# Patient Record
Sex: FEMALE | Race: WHITE | Employment: PART TIME | URBAN - METROPOLITAN AREA
[De-identification: names, ages, dates, MRNs, and addresses within clinical notes are randomized per-mention and may not be internally consistent; named-entity substitution may affect disease eponyms.]

---

## 2020-04-28 ENCOUNTER — APPOINTMENT (OUTPATIENT)
Dept: CT IMAGING | Age: 25
End: 2020-04-28
Payer: COMMERCIAL

## 2020-04-28 ENCOUNTER — HOSPITAL ENCOUNTER (EMERGENCY)
Age: 25
Discharge: LEFT BEFORE DISCHARGE | End: 2020-04-28
Attending: EMERGENCY MEDICINE
Payer: COMMERCIAL

## 2020-04-28 ENCOUNTER — TELEPHONE (OUTPATIENT)
Dept: ADMINISTRATIVE | Age: 25
End: 2020-04-28

## 2020-04-28 ENCOUNTER — APPOINTMENT (OUTPATIENT)
Dept: GENERAL RADIOLOGY | Age: 25
End: 2020-04-28
Payer: COMMERCIAL

## 2020-04-28 VITALS
SYSTOLIC BLOOD PRESSURE: 145 MMHG | BODY MASS INDEX: 51.53 KG/M2 | RESPIRATION RATE: 18 BRPM | WEIGHT: 280 LBS | OXYGEN SATURATION: 98 % | HEIGHT: 62 IN | HEART RATE: 73 BPM | TEMPERATURE: 97.3 F | DIASTOLIC BLOOD PRESSURE: 80 MMHG

## 2020-04-28 LAB
ALBUMIN SERPL-MCNC: 4.1 G/DL (ref 3.5–5.2)
ALP BLD-CCNC: 64 U/L (ref 35–104)
ALT SERPL-CCNC: 18 U/L (ref 0–32)
ANION GAP SERPL CALCULATED.3IONS-SCNC: 15 MMOL/L (ref 7–16)
AST SERPL-CCNC: 19 U/L (ref 0–31)
BASOPHILS ABSOLUTE: 0.07 E9/L (ref 0–0.2)
BASOPHILS RELATIVE PERCENT: 0.8 % (ref 0–2)
BILIRUB SERPL-MCNC: 0.4 MG/DL (ref 0–1.2)
BUN BLDV-MCNC: 14 MG/DL (ref 6–20)
CALCIUM SERPL-MCNC: 9.2 MG/DL (ref 8.6–10.2)
CHLORIDE BLD-SCNC: 108 MMOL/L (ref 98–107)
CO2: 21 MMOL/L (ref 22–29)
CREAT SERPL-MCNC: 0.5 MG/DL (ref 0.5–1)
EOSINOPHILS ABSOLUTE: 0.17 E9/L (ref 0.05–0.5)
EOSINOPHILS RELATIVE PERCENT: 1.8 % (ref 0–6)
GFR AFRICAN AMERICAN: >60
GFR NON-AFRICAN AMERICAN: >60 ML/MIN/1.73
GLUCOSE BLD-MCNC: 84 MG/DL (ref 74–99)
HCG, URINE, POC: NEGATIVE
HCT VFR BLD CALC: 36.8 % (ref 34–48)
HEMOGLOBIN: 11.9 G/DL (ref 11.5–15.5)
IMMATURE GRANULOCYTES #: 0.03 E9/L
IMMATURE GRANULOCYTES %: 0.3 % (ref 0–5)
LYMPHOCYTES ABSOLUTE: 1.93 E9/L (ref 1.5–4)
LYMPHOCYTES RELATIVE PERCENT: 20.8 % (ref 20–42)
Lab: NORMAL
MCH RBC QN AUTO: 29.2 PG (ref 26–35)
MCHC RBC AUTO-ENTMCNC: 32.3 % (ref 32–34.5)
MCV RBC AUTO: 90.4 FL (ref 80–99.9)
MONOCYTES ABSOLUTE: 0.62 E9/L (ref 0.1–0.95)
MONOCYTES RELATIVE PERCENT: 6.7 % (ref 2–12)
NEGATIVE QC PASS/FAIL: NORMAL
NEUTROPHILS ABSOLUTE: 6.44 E9/L (ref 1.8–7.3)
NEUTROPHILS RELATIVE PERCENT: 69.6 % (ref 43–80)
PDW BLD-RTO: 12.2 FL (ref 11.5–15)
PLATELET # BLD: 240 E9/L (ref 130–450)
PMV BLD AUTO: 11.3 FL (ref 7–12)
POSITIVE QC PASS/FAIL: NORMAL
POTASSIUM REFLEX MAGNESIUM: 3.6 MMOL/L (ref 3.5–5)
RBC # BLD: 4.07 E12/L (ref 3.5–5.5)
REASON FOR REJECTION: NORMAL
REJECTED TEST: NORMAL
SODIUM BLD-SCNC: 144 MMOL/L (ref 132–146)
TOTAL PROTEIN: 7 G/DL (ref 6.4–8.3)
TROPONIN: <0.01 NG/ML (ref 0–0.03)
WBC # BLD: 9.3 E9/L (ref 4.5–11.5)

## 2020-04-28 PROCEDURE — 84484 ASSAY OF TROPONIN QUANT: CPT

## 2020-04-28 PROCEDURE — 99285 EMERGENCY DEPT VISIT HI MDM: CPT

## 2020-04-28 PROCEDURE — 6360000004 HC RX CONTRAST MEDICATION: Performed by: RADIOLOGY

## 2020-04-28 PROCEDURE — 93005 ELECTROCARDIOGRAM TRACING: CPT | Performed by: EMERGENCY MEDICINE

## 2020-04-28 PROCEDURE — 85025 COMPLETE CBC W/AUTO DIFF WBC: CPT

## 2020-04-28 PROCEDURE — 71045 X-RAY EXAM CHEST 1 VIEW: CPT

## 2020-04-28 PROCEDURE — 80053 COMPREHEN METABOLIC PANEL: CPT

## 2020-04-28 RX ORDER — SODIUM CHLORIDE 0.9 % (FLUSH) 0.9 %
10 SYRINGE (ML) INJECTION 2 TIMES DAILY
Status: DISCONTINUED | OUTPATIENT
Start: 2020-04-28 | End: 2020-04-28 | Stop reason: HOSPADM

## 2020-04-28 RX ADMIN — IOPAMIDOL 60 ML: 755 INJECTION, SOLUTION INTRAVENOUS at 16:26

## 2020-04-28 ASSESSMENT — PAIN DESCRIPTION - PAIN TYPE: TYPE: ACUTE PAIN

## 2020-04-28 ASSESSMENT — PAIN DESCRIPTION - ORIENTATION: ORIENTATION: MID

## 2020-04-28 ASSESSMENT — PAIN DESCRIPTION - LOCATION: LOCATION: CHEST

## 2020-04-28 ASSESSMENT — PAIN SCALES - GENERAL: PAINLEVEL_OUTOF10: 6

## 2020-04-28 NOTE — ED PROVIDER NOTES
HPI:    Louis Gutierrez is a 30 y.o. female presenting to the emergency department for shortness of breath. Patient reports she tested positive for COVID on March 31st. She reports her symptoms had improved since being diagnosed and was able to go back to work at [employer]. Patient worked today as she felt more short of breath with associated chest pain, as she was sent to the ED for further evaluation. Patient believes she may have over did it at work. She denies current birth control or hormone therapy, history of blood clots, recent immobilization or cancer. No recent fever or chills although she notes some fatigue and cough at times. She admits to OTC medications, no prescription medication use. The history is provided by the patient. Shortness of Breath   Severity:  Mild  Onset quality:  Gradual  Timing:  Intermittent  Progression:  Waxing and waning  Chronicity:  Recurrent  Relieved by:  Rest  Worsened by: Activity  Associated symptoms: chest pain and cough    Associated symptoms: no abdominal pain, no fever, no headaches, no neck pain, no sore throat and no vomiting         Review of Systems   Constitutional: Negative for chills and fever. HENT: Negative for congestion and sore throat. Eyes: Negative for visual disturbance. Respiratory: Positive for cough and shortness of breath. Cardiovascular: Positive for chest pain. Gastrointestinal: Negative for abdominal pain, nausea and vomiting. Genitourinary: Negative for difficulty urinating and flank pain. Musculoskeletal: Negative for neck pain. Skin: Negative for color change. Neurological: Negative for headaches. Psychiatric/Behavioral: Negative for confusion. Physical Exam  Vitals signs and nursing note reviewed. Constitutional:       General: She is not in acute distress. HENT:      Head: Normocephalic and atraumatic.       Nose: Nose normal.      Mouth/Throat:      Mouth: Mucous membranes are moist.      Pharynx: No in order to evaluate for PE as patient was positive for covid and concern for hypercoagulable state. Patient had cta ordered, however was unable to be completed as the IV malfunctioned. The patient then refused any further testing despite our conversations and explaining my concern. Patient understood the risk of leaving without completing the work up- including worsening respiratory status and death. She has the capacity to make her own decisions. I adivsed close follow up outpatient or return to the ED if symptoms worsen. Patient will leave AMA. ED Course as of Apr 30 1434   Tue Apr 28, 2020   1649 Patient reassessed. CTA was to be performed however the IV site blew. Patient unable to have study performed. Patient is now refusing any further IV insertions. I discussed options for the patient including need for CTA to rule out blood clot with recent coronavirus and concern for hypercoagulable state. Patient wants to leave [address] and follow-up outpatient. I discussed the risk of leaving including deterioration of her respiratory status as well as death. Patient understands the risks and has the capacity to make her own decisions. Patient signed out. [MA]      ED Course User Index  [MA] Tony Rivas, DO        ----------------------------------------------- PAST HISTORY --------------------------------------------  Past Medical History:  has no past medical history on file. Past Surgical History:  has no past surgical history on file. Social History:  reports that she has never smoked. She has never used smokeless tobacco. She reports current alcohol use. She reports that she does not use drugs. Family History: family history is not on file. The patients home medications have been reviewed. Allergies: Patient has no known allergies.     ------------------------------------------------ RESULTS ---------------------------------------------------    LABS:  Results for BPM    P-R Interval 140 ms    QRS Duration 80 ms    Q-T Interval 370 ms    QTc Calculation (Bazett) 442 ms    P Axis 45 degrees    R Axis 48 degrees    T Axis 3 degrees       RADIOLOGY:    All Radiology results interpreted by Radiologist unless otherwise noted. XR CHEST PORTABLE   Final Result   normal chest                         EKG:  This EKG is signed and interpreted by ED Physician. Time: 1252  Rate: 86  Rhythm: Sinus. Interpretation: Normal sinus rhythm, normal axis, no QTC prolongation, no ST elevation. Comparison: no previous EKG.    ---------------------------- NURSING NOTES AND VITALS REVIEWED -------------------------   The nursing notes within the ED encounter and vital signs as below have been reviewed. BP (!) 145/80   Pulse 73   Temp 97.3 °F (36.3 °C) (Oral)   Resp 18   Ht 5' 2\" (1.575 m)   Wt 280 lb (127 kg)   LMP 03/28/2020   SpO2 98%   BMI 51.21 kg/m²   Oxygen Saturation Interpretation: Normal      ------------------------------------------PROGRESS NOTES -------------------------------------------    ED COURSE MEDICATIONS:                Medications   iopamidol (ISOVUE-370) 76 % injection 60 mL (60 mLs Intravenous Given 4/28/20 1626)       CONSULTATIONS:            none. PROCEDURES:            none. COUNSELING:   I have spoken with the patient and discussed todays results, in addition to providing specific details for the plan of care and counseling regarding the diagnosis and prognosis.     --------------------------------------- IMPRESSION & DISPOSITION --------------------------------     IMPRESSION(s):  1. Chest pain, unspecified type    2. Dyspnea and respiratory abnormalities        This patient's ED course included: a personal history and physicial examination, cardiac monitoring and continuous pulse oximetry    This patient has remained hemodynamically stable and been closely monitored during their ED course.     DISPOSITION:  Disposition: Other Disposition: Left

## 2020-04-29 LAB
EKG ATRIAL RATE: 86 BPM
EKG P AXIS: 45 DEGREES
EKG P-R INTERVAL: 140 MS
EKG Q-T INTERVAL: 370 MS
EKG QRS DURATION: 80 MS
EKG QTC CALCULATION (BAZETT): 442 MS
EKG R AXIS: 48 DEGREES
EKG T AXIS: 3 DEGREES
EKG VENTRICULAR RATE: 86 BPM

## 2020-04-30 ASSESSMENT — ENCOUNTER SYMPTOMS
ABDOMINAL PAIN: 0
VOMITING: 0
SORE THROAT: 0
NAUSEA: 0
COUGH: 1
SHORTNESS OF BREATH: 1
COLOR CHANGE: 0

## 2021-04-02 ENCOUNTER — IMMUNIZATION (OUTPATIENT)
Dept: PRIMARY CARE CLINIC | Age: 26
End: 2021-04-02
Payer: COMMERCIAL

## 2021-04-02 PROCEDURE — 0001A COVID-19, PFIZER VACCINE 30MCG/0.3ML DOSE: CPT | Performed by: CLINICAL NURSE SPECIALIST

## 2021-04-02 PROCEDURE — 91300 COVID-19, PFIZER VACCINE 30MCG/0.3ML DOSE: CPT | Performed by: CLINICAL NURSE SPECIALIST

## 2021-05-05 ENCOUNTER — IMMUNIZATION (OUTPATIENT)
Dept: PRIMARY CARE CLINIC | Age: 26
End: 2021-05-05
Payer: COMMERCIAL

## 2021-05-05 PROCEDURE — 0002A COVID-19, PFIZER VACCINE 30MCG/0.3ML DOSE: CPT | Performed by: NURSE PRACTITIONER

## 2021-05-05 PROCEDURE — 91300 COVID-19, PFIZER VACCINE 30MCG/0.3ML DOSE: CPT | Performed by: NURSE PRACTITIONER

## 2021-09-03 ENCOUNTER — OFFICE VISIT (OUTPATIENT)
Dept: FAMILY MEDICINE CLINIC | Age: 26
End: 2021-09-03
Payer: COMMERCIAL

## 2021-09-03 VITALS
WEIGHT: 293 LBS | HEIGHT: 62 IN | HEART RATE: 88 BPM | SYSTOLIC BLOOD PRESSURE: 137 MMHG | TEMPERATURE: 98.2 F | DIASTOLIC BLOOD PRESSURE: 75 MMHG | OXYGEN SATURATION: 96 % | BODY MASS INDEX: 53.92 KG/M2

## 2021-09-03 DIAGNOSIS — Z13.1 ENCOUNTER FOR SCREENING FOR DIABETES MELLITUS: ICD-10-CM

## 2021-09-03 DIAGNOSIS — N91.5 OLIGOMENORRHEA, UNSPECIFIED TYPE: ICD-10-CM

## 2021-09-03 DIAGNOSIS — L68.0 HIRSUTISM: ICD-10-CM

## 2021-09-03 DIAGNOSIS — E66.01 CLASS 3 SEVERE OBESITY DUE TO EXCESS CALORIES WITHOUT SERIOUS COMORBIDITY WITH BODY MASS INDEX (BMI) OF 50.0 TO 59.9 IN ADULT (HCC): ICD-10-CM

## 2021-09-03 DIAGNOSIS — N91.2 AMENORRHEA: ICD-10-CM

## 2021-09-03 DIAGNOSIS — R53.83 FATIGUE, UNSPECIFIED TYPE: ICD-10-CM

## 2021-09-03 DIAGNOSIS — Z13.31 POSITIVE DEPRESSION SCREENING: Primary | ICD-10-CM

## 2021-09-03 PROCEDURE — 99212 OFFICE O/P EST SF 10 MIN: CPT | Performed by: STUDENT IN AN ORGANIZED HEALTH CARE EDUCATION/TRAINING PROGRAM

## 2021-09-03 PROCEDURE — 99213 OFFICE O/P EST LOW 20 MIN: CPT | Performed by: STUDENT IN AN ORGANIZED HEALTH CARE EDUCATION/TRAINING PROGRAM

## 2021-09-03 RX ORDER — ESCITALOPRAM OXALATE 5 MG/1
5 TABLET ORAL DAILY
Qty: 30 TABLET | Refills: 5 | Status: SHIPPED
Start: 2021-09-03 | End: 2022-02-28

## 2021-09-03 SDOH — ECONOMIC STABILITY: FOOD INSECURITY: WITHIN THE PAST 12 MONTHS, YOU WORRIED THAT YOUR FOOD WOULD RUN OUT BEFORE YOU GOT MONEY TO BUY MORE.: NEVER TRUE

## 2021-09-03 SDOH — ECONOMIC STABILITY: FOOD INSECURITY: WITHIN THE PAST 12 MONTHS, THE FOOD YOU BOUGHT JUST DIDN'T LAST AND YOU DIDN'T HAVE MONEY TO GET MORE.: NEVER TRUE

## 2021-09-03 ASSESSMENT — COLUMBIA-SUICIDE SEVERITY RATING SCALE - C-SSRS
6. HAVE YOU EVER DONE ANYTHING, STARTED TO DO ANYTHING, OR PREPARED TO DO ANYTHING TO END YOUR LIFE?: NO
2. HAVE YOU ACTUALLY HAD ANY THOUGHTS OF KILLING YOURSELF?: NO
1. WITHIN THE PAST MONTH, HAVE YOU WISHED YOU WERE DEAD OR WISHED YOU COULD GO TO SLEEP AND NOT WAKE UP?: NO

## 2021-09-03 ASSESSMENT — SOCIAL DETERMINANTS OF HEALTH (SDOH): HOW HARD IS IT FOR YOU TO PAY FOR THE VERY BASICS LIKE FOOD, HOUSING, MEDICAL CARE, AND HEATING?: NOT HARD AT ALL

## 2021-09-03 ASSESSMENT — PATIENT HEALTH QUESTIONNAIRE - PHQ9
1. LITTLE INTEREST OR PLEASURE IN DOING THINGS: 2
10. IF YOU CHECKED OFF ANY PROBLEMS, HOW DIFFICULT HAVE THESE PROBLEMS MADE IT FOR YOU TO DO YOUR WORK, TAKE CARE OF THINGS AT HOME, OR GET ALONG WITH OTHER PEOPLE: 1
SUM OF ALL RESPONSES TO PHQ QUESTIONS 1-9: 11
3. TROUBLE FALLING OR STAYING ASLEEP: 2
SUM OF ALL RESPONSES TO PHQ9 QUESTIONS 1 & 2: 3
SUM OF ALL RESPONSES TO PHQ QUESTIONS 1-9: 11
8. MOVING OR SPEAKING SO SLOWLY THAT OTHER PEOPLE COULD HAVE NOTICED. OR THE OPPOSITE, BEING SO FIGETY OR RESTLESS THAT YOU HAVE BEEN MOVING AROUND A LOT MORE THAN USUAL: 0
4. FEELING TIRED OR HAVING LITTLE ENERGY: 0
9. THOUGHTS THAT YOU WOULD BE BETTER OFF DEAD, OR OF HURTING YOURSELF: 0
7. TROUBLE CONCENTRATING ON THINGS, SUCH AS READING THE NEWSPAPER OR WATCHING TELEVISION: 3
2. FEELING DOWN, DEPRESSED OR HOPELESS: 1
6. FEELING BAD ABOUT YOURSELF - OR THAT YOU ARE A FAILURE OR HAVE LET YOURSELF OR YOUR FAMILY DOWN: 2
5. POOR APPETITE OR OVEREATING: 1
SUM OF ALL RESPONSES TO PHQ QUESTIONS 1-9: 11

## 2021-09-03 ASSESSMENT — LIFESTYLE VARIABLES
HOW OFTEN DO YOU HAVE A DRINK CONTAINING ALCOHOL: 2-4 TIMES A MONTH
HOW MANY STANDARD DRINKS CONTAINING ALCOHOL DO YOU HAVE ON A TYPICAL DAY: 1 OR 2

## 2021-09-03 NOTE — PROGRESS NOTES
S: 30 y.o. female here for establishing care and hirsutism. AUB more recently. Gained 30 lbs during pandemic. Some depression now. No SI or HI. PHQ9 = 11. Nulliparous, never sexually active. O: VS: /75 (Site: Right Lower Arm, Position: Sitting, Cuff Size: Medium Adult)   Pulse 88   Temp 98.2 °F (36.8 °C) (Temporal)   Ht 5' 2\" (1.575 m)   Wt (!) 305 lb (138.3 kg)   SpO2 96%   BMI 55.79 kg/m²    General: NAD, alert and interacting appropriately. CV:  RRR, no gallops, rubs, or murmurs    Resp: CTAB   Abd:  Soft, nontender   Ext:  No edema    Justin Baires was seen today for new patient, menstrual problem and hirsutism. Diagnoses and all orders for this visit:    Positive depression screening  -     Cancel: Positive Screen for Clinical Depression with a Documented Follow-up Plan   -     escitalopram (LEXAPRO) 5 MG tablet; Take 1 tablet by mouth daily    Fatigue, unspecified type  -     Cancel: TSH; Future  -     TSH; Future    Hirsutism  -     Cancel: 17-HYDROXYPROGESTERONE; Future  -     Cancel: Testosterone, free, total; Future  -     Cancel: HCG, QUANTITATIVE, PREGNANCY; Future  -     Cancel: PROLACTIN; Future  -     Cancel: TSH; Future  -     Cancel: FOLLICLE STIMULATING HORMONE; Future  -     TESTOSTERONE; Future  -     17-HYDROXYPROGESTERONE; Future    Amenorrhea  -     Cancel: 17-HYDROXYPROGESTERONE; Future  -     Cancel: Testosterone, free, total; Future  -     Cancel: HCG, QUANTITATIVE, PREGNANCY; Future  -     Cancel: PROLACTIN; Future  -     Cancel: TSH; Future  -     Cancel: FOLLICLE STIMULATING HORMONE; Future  -     FOLLICLE STIMULATING HORMONE; Future  -     LUTEINIZING HORMONE; Future  -     PROLACTIN; Future  -     HCG, QUANTITATIVE, PREGNANCY;  Future  -     TESTOSTERONE; Future  -     17-HYDROXYPROGESTERONE; Future  -     HEMOGLOBIN A1C; Future    Class 3 severe obesity due to excess calories without serious comorbidity with body mass index (BMI) of 50.0 to 59.9 in adult ([facility] [address]  -     Cancel: LIPID PANEL; Future  -     LIPID PANEL; Future  -     HEMOGLOBIN A1C; Future    Encounter for screening for diabetes mellitus  -     Cancel: HEMOGLOBIN A1C; Future    Oligomenorrhea, unspecified type  -     US NON OB TRANSVAGINAL; Future         Attending Physician Statement  I have discussed the case, including pertinent history and exam findings with the resident. I agree with the documented assessment and plan.

## 2021-09-03 NOTE — PROGRESS NOTES
[address] [facility]  FAMILY MEDICINE RESIDENCY PROGRAM  DATE OF VISIT : 9/3/2021    Patient : Roma Reyes   Age : 30 y.o.  : 1995   MRN : <[id number]>   ______________________________________________________________________    Chief Complaint :   Chief Complaint   Patient presents with    New Patient    Menstrual Problem    Hirsutism       HPI : Roma Reyes is 30 y.o. female who presented to the clinic today for establishing care and for concern of oligomenorrhea and hirsutism. Patient states she had mostly regular menses prior to having covid about a year ago. Her first menses after covid was 3 months late and then has been intermittent without regularity since then. Her last full menses was in July, and she has been spotting since then. Small clots present. She also noticed that she started to develop facial hair growth after covid as well. She also notes that she has had some depression since the pandemic started. She tries to stay connected with people but once home alone she feels her mood drop and has less motivation. She also reports fatigue and 30 lbs weight gain over the pandemic. She went to the ED about a year ago for chest pain, and reports SOB with stairs, but nothing that stops her. No current chest pain. She reports occasional pain with defecation. She takes occasional melatonin. Past Medical History :  No past medical history on file. No past surgical history on file. Allergies :   No Known Allergies    Medication List :    Current Outpatient Medications   Medication Sig Dispense Refill    escitalopram (LEXAPRO) 5 MG tablet Take 1 tablet by mouth daily 30 tablet 5     No current facility-administered medications for this visit. Review of Systems :  Review of Systems   Constitutional: Positive for fatigue and unexpected weight change (gain). Respiratory: Positive for shortness of breath (with stairs).     Cardiovascular: Negative comorbidity with body mass index (BMI) of 50.0 to 59.9 in adult [facility])   Encounter for screening for diabetes mellitus  - LIPID PANEL; Future  - HEMOGLOBIN A1C; Future    Oligomenorrhea, unspecified type  - US NON OB TRANSVAGINAL; Future      Additional plan and future considerations:       Return to Office: Return in about 4 weeks (around 10/1/2021) for oligomenorrhea.     Grant Peoples DO   Case discussed with Dr. Yuli Gale

## 2021-09-04 ENCOUNTER — HOSPITAL ENCOUNTER (OUTPATIENT)
Age: 26
Discharge: HOME | End: 2021-09-04
Payer: COMMERCIAL

## 2021-09-04 DIAGNOSIS — E66.01 CLASS 3 SEVERE OBESITY DUE TO EXCESS CALORIES WITHOUT SERIOUS COMORBIDITY WITH BODY MASS INDEX (BMI) OF 50.0 TO 59.9 IN ADULT (HCC): ICD-10-CM

## 2021-09-04 DIAGNOSIS — R53.83 FATIGUE, UNSPECIFIED TYPE: ICD-10-CM

## 2021-09-04 DIAGNOSIS — N91.2 AMENORRHEA: ICD-10-CM

## 2021-09-04 DIAGNOSIS — L68.0 HIRSUTISM: ICD-10-CM

## 2021-09-04 LAB
CHOLESTEROL, TOTAL: 150 MG/DL (ref 0–199)
FOLLICLE STIMULATING HORMONE: 7.9 MIU/ML
GONADOTROPIN, CHORIONIC (HCG) QUANT: <0.1 MIU/ML
HBA1C MFR BLD: 5.4 % (ref 4–5.6)
HDLC SERPL-MCNC: 48 MG/DL
LDL CHOLESTEROL CALCULATED: 75 MG/DL (ref 0–99)
LUTEINIZING HORMONE: 14 MIU/ML
PROLACTIN: 35.47 NG/ML
TESTOSTERONE TOTAL: 32.2 NG/DL
TRIGL SERPL-MCNC: 136 MG/DL (ref 0–149)
TSH SERPL DL<=0.05 MIU/L-ACNC: 2.53 UIU/ML (ref 0.27–4.2)
VLDLC SERPL CALC-MCNC: 27 MG/DL

## 2021-09-04 PROCEDURE — 83498 ASY HYDROXYPROGESTERONE 17-D: CPT

## 2021-09-04 PROCEDURE — 83002 ASSAY OF GONADOTROPIN (LH): CPT

## 2021-09-04 PROCEDURE — 83001 ASSAY OF GONADOTROPIN (FSH): CPT

## 2021-09-04 PROCEDURE — 84443 ASSAY THYROID STIM HORMONE: CPT

## 2021-09-04 PROCEDURE — 83036 HEMOGLOBIN GLYCOSYLATED A1C: CPT

## 2021-09-04 PROCEDURE — 84146 ASSAY OF PROLACTIN: CPT

## 2021-09-04 PROCEDURE — 36415 COLL VENOUS BLD VENIPUNCTURE: CPT

## 2021-09-04 PROCEDURE — 84702 CHORIONIC GONADOTROPIN TEST: CPT

## 2021-09-04 PROCEDURE — 84403 ASSAY OF TOTAL TESTOSTERONE: CPT

## 2021-09-04 PROCEDURE — 80061 LIPID PANEL: CPT

## 2021-09-06 ASSESSMENT — ENCOUNTER SYMPTOMS: SHORTNESS OF BREATH: 1

## 2021-09-09 LAB — 17-OH PROGESTERONE LCMS: 51.16 NG/DL

## 2021-09-23 ENCOUNTER — TELEPHONE (OUTPATIENT)
Dept: FAMILY MEDICINE CLINIC | Age: 26
End: 2021-09-23

## 2021-09-23 NOTE — TELEPHONE ENCOUNTER
Called and discussed results. Patient to have non ob vag u/s on 9/26. She reports last menses was last week, heavy. Still spotting now. Current follow up appointment scheduled 10/5/21 at 9 AM. Patient now has work schedule and would like to change the time or date if possible as she is working. Will ask  to call her tomorrow to reschedule appointment, best times between 1-3 pm for calling.

## 2021-09-26 ENCOUNTER — HOSPITAL ENCOUNTER (OUTPATIENT)
Dept: ULTRASOUND IMAGING | Age: 26
Discharge: HOME | End: 2021-09-28
Payer: COMMERCIAL

## 2021-09-26 DIAGNOSIS — N91.5 OLIGOMENORRHEA, UNSPECIFIED TYPE: ICD-10-CM

## 2021-09-26 PROCEDURE — 76830 TRANSVAGINAL US NON-OB: CPT

## 2021-10-05 ENCOUNTER — OFFICE VISIT (OUTPATIENT)
Dept: FAMILY MEDICINE CLINIC | Age: 26
End: 2021-10-05
Payer: COMMERCIAL

## 2021-10-05 VITALS
BODY MASS INDEX: 53.92 KG/M2 | DIASTOLIC BLOOD PRESSURE: 92 MMHG | TEMPERATURE: 97.4 F | HEART RATE: 85 BPM | WEIGHT: 293 LBS | SYSTOLIC BLOOD PRESSURE: 146 MMHG | OXYGEN SATURATION: 97 % | HEIGHT: 62 IN

## 2021-10-05 DIAGNOSIS — R53.83 FATIGUE, UNSPECIFIED TYPE: ICD-10-CM

## 2021-10-05 DIAGNOSIS — R03.0 ELEVATED BLOOD PRESSURE READING: ICD-10-CM

## 2021-10-05 DIAGNOSIS — N92.1 MENORRHAGIA WITH IRREGULAR CYCLE: Primary | ICD-10-CM

## 2021-10-05 DIAGNOSIS — Z53.20 SCREENING FOR HEPATITIS C DECLINED: ICD-10-CM

## 2021-10-05 DIAGNOSIS — E28.2 PCOS (POLYCYSTIC OVARIAN SYNDROME): ICD-10-CM

## 2021-10-05 DIAGNOSIS — Z11.4 SCREENING FOR HUMAN IMMUNODEFICIENCY VIRUS WITHOUT PRESENCE OF RISK FACTORS: ICD-10-CM

## 2021-10-05 LAB
CONTROL: POSITIVE
PREGNANCY TEST URINE, POC: NEGATIVE

## 2021-10-05 PROCEDURE — 99213 OFFICE O/P EST LOW 20 MIN: CPT | Performed by: STUDENT IN AN ORGANIZED HEALTH CARE EDUCATION/TRAINING PROGRAM

## 2021-10-05 PROCEDURE — 36415 COLL VENOUS BLD VENIPUNCTURE: CPT | Performed by: FAMILY MEDICINE

## 2021-10-05 PROCEDURE — 99212 OFFICE O/P EST SF 10 MIN: CPT | Performed by: STUDENT IN AN ORGANIZED HEALTH CARE EDUCATION/TRAINING PROGRAM

## 2021-10-05 PROCEDURE — 81025 URINE PREGNANCY TEST: CPT | Performed by: STUDENT IN AN ORGANIZED HEALTH CARE EDUCATION/TRAINING PROGRAM

## 2021-10-05 RX ORDER — NORETHINDRONE ACETATE AND ETHINYL ESTRADIOL 1; .02 MG/1; MG/1
1 TABLET ORAL DAILY
Qty: 21 TABLET | Refills: 3 | Status: SHIPPED
Start: 2021-10-05 | End: 2021-10-19

## 2021-10-05 RX ORDER — BLOOD PRESSURE TEST KIT
1 KIT MISCELLANEOUS DAILY
Qty: 1 KIT | Refills: 0 | Status: SHIPPED
Start: 2021-10-05 | End: 2021-10-19

## 2021-10-05 NOTE — PROGRESS NOTES
[facility]  FAMILY MEDICINE RESIDENCY PROGRAM  DATE OF VISIT : 10/5/2021    Patient : Priyanka Boudreaux   Age : 30 y.o.  : 1995   MRN : [id number]   ______________________________________________________________________    Chief Complaint :   Chief Complaint   Patient presents with    Menstrual Problem    Fatigue       HPI : Priyanka Boudreaux is 30 y.o. female who presented to the clinic today for follow up of menorrhagia and fatigue. Reports she has had vaginal bleeding daily for past several months, and fatigue. Reports using 3 pads overnight and feeling lightheaded. Has been told she looks pale by colleagues. Has been taking iron pill for the past few days, not noticing difference yet. No contraception. Also reports sharp pelvic cramps. Ibuprofen helps a little. Takes only 1-2 x 200 mg tablets per day. Has never had regular periods. Also reports occasional night sweats and 5 lb weight gain in past month. Blood pressure elevated x 3. Agreeable to recommended screening of Hep C and HIV. Past Medical History :  No past medical history on file. No past surgical history on file.     Allergies :   No Known Allergies    Medication List :    Current Outpatient Medications   Medication Sig Dispense Refill    norethindrone-ethinyl estradiol (MICROGESTIN ) 1-20 MG-MCG per tablet Take 1 tablet by mouth daily 21 tablet 3    Blood Pressure KIT 1 each by Does not apply route daily 1 kit 0    escitalopram (LEXAPRO) 5 MG tablet Take 1 tablet by mouth daily 30 tablet 5     No current facility-administered medications for this visit.        ______________________________________________________________________    Physical Exam :    Vitals: BP (!) 146/92 (Site: Right Lower Arm, Position: Sitting, Cuff Size: Medium Adult)   Pulse 85   Temp 97.4 °F (36.3 °C) (Temporal)   Ht 5' 2\" (1.575 m)   Wt (!) 310 lb (140.6 kg)   SpO2 97%   Breastfeeding Yes   BMI 56.70 kg/m²   General Appearance: Awake, alert, oriented, and in NAD; but does appear mildly fatigued  HEENT: NCAT, no pallor or icterus  Neck: Symmetrical, trachea midline. Chest wall/Lung: CTAB, respirations unlabored. No ronchi/wheezing/rales   Heart: RRR, normal S1 and S2, no murmurs, rubs or gallops  Abdomen: SNTND  Extremities: Extremities normal, atraumatic, no cyanosis, clubbing or edema. Skin: Skin color, texture, turgor normal, no rashes or lesions  Musculokeletal: ROM grossly normal in all joints of extremities, no obvious joint swelling  Neurologic: Alert&Oriented x3. No focal motor deficits detected   Psychiatric: Normal mood. Normal affect. Normal behavior  ______________________________________________________________________    Assessment & Plan :    Menorrhagia with irregular cycle  PCOS (polycystic ovarian syndrome)  · Start OCP  - CBC WITH AUTO DIFFERENTIAL; Future  - norethindrone-ethinyl estradiol (MICROGESTIN 1/20) 1-20 MG-MCG per tablet; Take 1 tablet by mouth daily  Dispense: 21 tablet; Refill: 3  - POCT urine pregnancy  -Refer to OBGYN    Fatigue, unspecified type  · Concern for anemia with prolonged menses  - HIV Screen; Future  - HEPATITIS C ANTIBODY; Future  - CBC WITH AUTO DIFFERENTIAL; Future    Elevated blood pressure reading  · Check at home if possible, rule out white coat HTN  - COMPREHENSIVE METABOLIC PANEL; Future  - Blood Pressure KIT; 1 each by Does not apply route daily  Dispense: 1 kit; Refill: 0    Screening for human immunodeficiency virus without presence of risk factors  - HIV Screen; Future    Screening for hepatitis C declined  - HEPATITIS C ANTIBODY; Future      Additional plan and future considerations:       Return to Office: Return in about 2 weeks (around 10/19/2021) for elevated BP.     Shruthi Christian DO   Case discussed with Dr. Poppy Dumont

## 2021-10-05 NOTE — LETTER
[facility]  [address] [place] [address]  Phone: [phone]  Fax: [phone]    Fe Pollard DO        October 5, 2021     Patient: Sophie Calvillo   YOB: 1995   Date of Visit: 10/5/2021       To Whom it May Concern:    Sophie Calvillo was seen in my clinic on 10/5/2021. She may return to work on 10/08/2021. If you have any questions or concerns, please don't hesitate to call.     Sincerely,         Fe Pollard DO

## 2021-10-05 NOTE — PROGRESS NOTES
Attending Physician Statement    S:   Chief Complaint   Patient presents with    Menstrual Problem    Fatigue      30year old female with history of hirsutism here to discuss menstrual problems and fatigue. Filling 3 pads during the day and changes it multiple times overnight. Started taking iron. Denies constipation. Periods have been irregular for several months. This current period has been going on for weeks. She has also had some cramping. Sharp pain in lower pelvic region. 15-20 sec. Takes ibuprofen which helps . Family history of ovarian cysts and a cousin with endometriosis. Has night sweats, decreased appetite. O: Blood pressure (!) 146/92, pulse 85, temperature 97.4 °F (36.3 °C), temperature source Temporal, height 5' 2\" (1.575 m), weight (!) 310 lb (140.6 kg), SpO2 97 %, currently breastfeeding. Exam:   Heart - RRR   Lungs - clear     A: elevated bp without history of htn, fatigue, pcos  P:  Start ocp    Refer to ob/gyn   Check bp at home    Check labs    Follow-up as ordered    Attending Attestation   I have discussed the case, including pertinent history and exam findings with the resident. I agree with the documented assessment and plan.

## 2021-10-09 PROBLEM — E28.2 PCOS (POLYCYSTIC OVARIAN SYNDROME): Status: ACTIVE | Noted: 2021-10-09

## 2021-10-09 PROBLEM — R03.0 ELEVATED BLOOD PRESSURE READING: Status: ACTIVE | Noted: 2021-10-09

## 2021-10-09 PROBLEM — N92.1 MENORRHAGIA WITH IRREGULAR CYCLE: Status: ACTIVE | Noted: 2021-10-09

## 2021-10-19 ENCOUNTER — OFFICE VISIT (OUTPATIENT)
Dept: FAMILY MEDICINE CLINIC | Age: 26
End: 2021-10-19
Payer: COMMERCIAL

## 2021-10-19 VITALS
DIASTOLIC BLOOD PRESSURE: 75 MMHG | TEMPERATURE: 99.5 F | BODY MASS INDEX: 53.92 KG/M2 | WEIGHT: 293 LBS | OXYGEN SATURATION: 98 % | HEIGHT: 62 IN | SYSTOLIC BLOOD PRESSURE: 112 MMHG | HEART RATE: 92 BPM

## 2021-10-19 DIAGNOSIS — N92.1 MENORRHAGIA WITH IRREGULAR CYCLE: Primary | ICD-10-CM

## 2021-10-19 DIAGNOSIS — R09.82 POST-NASAL DRIP: ICD-10-CM

## 2021-10-19 PROCEDURE — 99213 OFFICE O/P EST LOW 20 MIN: CPT | Performed by: STUDENT IN AN ORGANIZED HEALTH CARE EDUCATION/TRAINING PROGRAM

## 2021-10-19 PROCEDURE — 99212 OFFICE O/P EST SF 10 MIN: CPT | Performed by: STUDENT IN AN ORGANIZED HEALTH CARE EDUCATION/TRAINING PROGRAM

## 2021-10-19 RX ORDER — NORETHINDRONE ACETATE AND ETHINYL ESTRADIOL AND FERROUS FUMARATE 1MG-20(24)
1 KIT ORAL DAILY
COMMUNITY
End: 2021-12-09 | Stop reason: SDUPTHER

## 2021-10-19 NOTE — PROGRESS NOTES
Attending Physician Statement    S:   Chief Complaint   Patient presents with    2 Week Follow-Up      Patient is a 30year old femael here for follow up of hypertension . bp improved. Started on ocp . Bleeding stopped for a week. Has started up again but is much lighter. Only one pad a day. Referral to Dr. Samuel Schulte. O: Blood pressure 112/75, pulse 92, temperature 99.5 °F (37.5 °C), temperature source Temporal, height 5' 2\" (1.575 m), weight (!) 307 lb (139.3 kg), last menstrual period 07/01/2021, SpO2 98 %, not currently breastfeeding. Exam:   Heart - RRR   Lungs - clear   Abd- BS+, nt/nd  A: abnormal periods   P:  Continue birth control    Referral to ob/gyn    Follow-up as ordered    Attending Attestation   I have discussed the case, including pertinent history and exam findings with the resident. I agree with the documented assessment and plan.

## 2021-10-19 NOTE — PROGRESS NOTES
[facility]  FAMILY MEDICINE RESIDENCY PROGRAM  DATE OF VISIT : 10/19/2021    Patient : Mela Montilla   Age : 30 y.o.  : 1995   MRN : [id number]   ______________________________________________________________________    Chief Complaint :   Chief Complaint   Patient presents with    Menorrhagia     2 week follow up       HPI : Mela Montilla is 30 y.o. female who presented to the clinic today for follow up of menorrhagia. She reports that starting birth control caused her heavy bleeding to stop for a week, but it restarted, though lighter. Previously, she was using a tampon plus changing out 3 pads daily to catch what overflowed from the tampon. She is only using one pad a day now. She also reports that she has had sharp cramps in her lower abdomen for the past few months, but states it has been more frequent since the birth control was started. It happens in either R or L lower quadrants. No burning or pain with urination. Only lasts for about 20 seconds, but occurs 2-3 times per day. Patient is tired appearing today, reports she had post nasal drainage last night that kept her up. Only taking tea with honey right now, but has OTC medication at home. Past Medical History :  No past medical history on file. No past surgical history on file.     Allergies :   No Known Allergies    Medication List :    Current Outpatient Medications   Medication Sig Dispense Refill    Norethin Ace-Eth Estrad-FE 1-20 MG-MCG(24) TABS Take 1 tablet by mouth daily      escitalopram (LEXAPRO) 5 MG tablet Take 1 tablet by mouth daily 30 tablet 5     No current facility-administered medications for this visit.        ______________________________________________________________________    Physical Exam :    Vitals: /75   Pulse 92   Temp 99.5 °F (37.5 °C) (Temporal)   Ht 5' 2\" (1.575 m)   Wt (!) 307 lb (139.3 kg)   LMP 2021 (Approximate)   SpO2 98%   Breastfeeding No Comment: bleeding on and off since July  BMI 56.15 kg/m²   General Appearance: Somewhat tired, but alert, oriented, and in NAD  HEENT: NCAT, no pallor or icterus  Neck: Symmetrical, trachea midline. Chest wall/Lung: CTAB, respirations unlabored. No ronchi/wheezing/rales  Heart: RRR, normal S1 and S2, no murmurs, rubs or gallops  Abdomen: SNTND; BS +4  Neurologic: Alert&Oriented x3. No focal motor deficits detected   Psychiatric: Normal mood. Normal affect. Normal behavior  ______________________________________________________________________    Assessment & Plan :    1. Menorrhagia with irregular cycle  · Symptoms improved  · Referral placed to OBGYN, phone number provided  · Continue BC    2. Post-nasal drip  · OTC medication      Additional plan and future considerations:       Return to Office: Return 2 months.     Lisbet Fairchild DO   Case discussed with Dr. Lucila Alvarez

## 2021-10-19 NOTE — PATIENT INSTRUCTIONS
[facility], [address] [place] [address], [facility] Isaias ECHEVERRIA   YSEZA:[phone]

## 2021-11-23 ENCOUNTER — OFFICE VISIT (OUTPATIENT)
Dept: OBGYN | Age: 26
End: 2021-11-23
Payer: COMMERCIAL

## 2021-11-23 VITALS
DIASTOLIC BLOOD PRESSURE: 73 MMHG | HEART RATE: 91 BPM | BODY MASS INDEX: 55.42 KG/M2 | SYSTOLIC BLOOD PRESSURE: 139 MMHG | WEIGHT: 293 LBS

## 2021-11-23 DIAGNOSIS — Z12.4 SCREENING FOR CERVICAL CANCER: ICD-10-CM

## 2021-11-23 DIAGNOSIS — E28.2 PCOS (POLYCYSTIC OVARIAN SYNDROME): Primary | ICD-10-CM

## 2021-11-23 DIAGNOSIS — R93.89 THICKENED ENDOMETRIUM: ICD-10-CM

## 2021-11-23 DIAGNOSIS — N93.9 ABNORMAL UTERINE BLEEDING (AUB): ICD-10-CM

## 2021-11-23 LAB — PROLACTIN: 18.91 NG/ML

## 2021-11-23 PROCEDURE — G8417 CALC BMI ABV UP PARAM F/U: HCPCS | Performed by: OBSTETRICS & GYNECOLOGY

## 2021-11-23 PROCEDURE — 36415 COLL VENOUS BLD VENIPUNCTURE: CPT | Performed by: OBSTETRICS & GYNECOLOGY

## 2021-11-23 PROCEDURE — 1036F TOBACCO NON-USER: CPT | Performed by: OBSTETRICS & GYNECOLOGY

## 2021-11-23 PROCEDURE — 99204 OFFICE O/P NEW MOD 45 MIN: CPT | Performed by: OBSTETRICS & GYNECOLOGY

## 2021-11-23 PROCEDURE — G8427 DOCREV CUR MEDS BY ELIG CLIN: HCPCS | Performed by: OBSTETRICS & GYNECOLOGY

## 2021-11-23 PROCEDURE — 99203 OFFICE O/P NEW LOW 30 MIN: CPT | Performed by: OBSTETRICS & GYNECOLOGY

## 2021-11-23 PROCEDURE — G8484 FLU IMMUNIZE NO ADMIN: HCPCS | Performed by: OBSTETRICS & GYNECOLOGY

## 2021-11-23 NOTE — PROGRESS NOTES
Patient is here today for new patient visit, a pap smear and blood work was obtained and will be sent to the lab.

## 2021-11-23 NOTE — PROGRESS NOTES
HISTORY OF PRESENT ILLNESS:    30 y.o. female  No obstetric history on file. presents for evaluation of AUB. She had regular periods until a little over a year ago where she started to have them q 1-2 months. She also has had abnormal hair growth on her chin for 2 years. Then in July, she started her period and it didn't stop, with heavy bleeding every day. In September she saw her PCP who ordered labs and [address]. US showed ES of 14 mm despite the bleeding. Her prolactin was minimally elevated. Remainder of labs, HgbA1c, and lipid panel were normal. Since starting her ocps, she states she has only had spotting irregularly. She also has an occasional stabbing pain and is concerned if this is normal. SHe is unsure if it is associated with spotting. She is virginal.            Past Medical History: No past medical history on file. OB History   No obstetric history on file. Past Surgical History:   Past Surgical History:   Procedure Laterality Date    ARM SURGERY      CARPAL TUNNEL RELEASE      TONSILLECTOMY      WISDOM TOOTH EXTRACTION          Allergies: Patient has no known allergies. Medications:   Current Outpatient Medications   Medication Sig Dispense Refill    Norethin Ace-Eth Estrad-FE 1-20 MG-MCG(24) TABS Take 1 tablet by mouth daily      escitalopram (LEXAPRO) 5 MG tablet Take 1 tablet by mouth daily 30 tablet 5     No current facility-administered medications for this visit.          Social History:   Social History     Tobacco Use    Smoking status: Never Smoker    Smokeless tobacco: Never Used   Substance Use Topics    Alcohol use: Yes        Family History:   Family History   Problem Relation Age of Onset    Breast Cancer Maternal Grandmother 72       REVIEW OF SYSTEMS:    Constitutional: negative  HEENT: negative  Breast: negative  Respiratory: negative  Cardiovascular: negative  Gastrointestinal: negative  Genitourinary: As per HPI  Integument: negative  Neurological: negative  Endocrine: chin hair growth          PHYSICAL EXAM  /73   Pulse 91   Wt (!) 303 lb (137.4 kg)   BMI 55.42 kg/m²       General appearance: alert, cooperative and in no acute distress. Head: NCAT, hirsuitism  Abdomen: soft, non-tender; bowel sounds normal; no masses,  no organomegaly  Psych: No acute distress, mood and affect appropriate    Pelvic Exam:   EXTERNAL GENITALIA: normal external genitalia, no external lesions  VAGINA: normal rugae, no discharge   CERVIX: normal appearing, no lesions, no bleeding  UTERUS: uterus is normal size, shape, consistency and nontender   ADNEXA: normal adnexa in size, nontender and no masses. RECTUM: no hemorrhoids or rectal masses. ASSESSMENT :      Diagnosis Orders   1. PCOS (polycystic ovarian syndrome)  US NON OB TRANSVAGINAL   2. Abnormal uterine bleeding (AUB)  PROLACTIN    US NON OB TRANSVAGINAL   3. Thickened endometrium  US NON OB TRANSVAGINAL        PLAN:    Return following US, for Endometrial biopsy. No orders of the defined types were placed in this encounter.       Orders Placed This Encounter   Procedures    US NON OB TRANSVAGINAL     Standing Status:   Future     Standing Expiration Date:   11/23/2022    PROLACTIN     Standing Status:   Future     Standing Expiration Date:   11/23/2022         Electronically signed by Priscila Nielson DO on 11/23/21

## 2021-12-01 LAB
CHLAMYDIA BY THIN PREP: NEGATIVE
N. GONORRHOEAE DNA, THIN PREP: NEGATIVE
SOURCE: NORMAL

## 2021-12-08 ENCOUNTER — TELEPHONE (OUTPATIENT)
Dept: OBGYN | Age: 26
End: 2021-12-08

## 2021-12-08 NOTE — TELEPHONE ENCOUNTER
Patient called in and stated that she is having painful cramps and vaginal bleeding with clots. Patient has an appt next week for an US and with you for an [facility], Patient wanted to let you know.

## 2021-12-08 NOTE — TELEPHONE ENCOUNTER
Can take motrin and  Tylenol for pain. Should go to ED if she is bleeding through more than a pad an hour.  Thank you

## 2021-12-09 ENCOUNTER — OFFICE VISIT (OUTPATIENT)
Dept: FAMILY MEDICINE CLINIC | Age: 26
End: 2021-12-09
Payer: COMMERCIAL

## 2021-12-09 VITALS
OXYGEN SATURATION: 99 % | WEIGHT: 293 LBS | HEART RATE: 83 BPM | BODY MASS INDEX: 53.92 KG/M2 | SYSTOLIC BLOOD PRESSURE: 150 MMHG | TEMPERATURE: 98.3 F | HEIGHT: 62 IN | DIASTOLIC BLOOD PRESSURE: 95 MMHG

## 2021-12-09 DIAGNOSIS — R03.0 ELEVATED BLOOD PRESSURE READING: ICD-10-CM

## 2021-12-09 DIAGNOSIS — N92.1 MENORRHAGIA WITH IRREGULAR CYCLE: Primary | ICD-10-CM

## 2021-12-09 PROCEDURE — G8484 FLU IMMUNIZE NO ADMIN: HCPCS | Performed by: STUDENT IN AN ORGANIZED HEALTH CARE EDUCATION/TRAINING PROGRAM

## 2021-12-09 PROCEDURE — 1036F TOBACCO NON-USER: CPT | Performed by: STUDENT IN AN ORGANIZED HEALTH CARE EDUCATION/TRAINING PROGRAM

## 2021-12-09 PROCEDURE — G8417 CALC BMI ABV UP PARAM F/U: HCPCS | Performed by: STUDENT IN AN ORGANIZED HEALTH CARE EDUCATION/TRAINING PROGRAM

## 2021-12-09 PROCEDURE — 99212 OFFICE O/P EST SF 10 MIN: CPT | Performed by: STUDENT IN AN ORGANIZED HEALTH CARE EDUCATION/TRAINING PROGRAM

## 2021-12-09 PROCEDURE — G8427 DOCREV CUR MEDS BY ELIG CLIN: HCPCS | Performed by: STUDENT IN AN ORGANIZED HEALTH CARE EDUCATION/TRAINING PROGRAM

## 2021-12-09 PROCEDURE — 99213 OFFICE O/P EST LOW 20 MIN: CPT | Performed by: STUDENT IN AN ORGANIZED HEALTH CARE EDUCATION/TRAINING PROGRAM

## 2021-12-09 RX ORDER — NORETHINDRONE ACETATE AND ETHINYL ESTRADIOL AND FERROUS FUMARATE 1MG-20(24)
1 KIT ORAL DAILY
Qty: 28 TABLET | Refills: 3 | Status: SHIPPED
Start: 2021-12-09 | End: 2022-01-06

## 2021-12-09 NOTE — PROGRESS NOTES
S: Safia Velasquez 30 y.o. female  here for follow-up. Chief concerns are menorrhagia; also to address transiently elevated blood pressure. Menorrhagia: Response to OCP but then symptoms recurred. Patient was referred to OB/GYN. Work-up included Pap smear and prolactin, which have returned normal.  Ultrasound and D&C with biopsy planned. She is experiencing bleeding in between periods; she will get right lower quadrant pain that is of severe intensity and is described as a \"gas balloon\". Labs reviewed and were unremarkable. Elevated blood pressure: No previous diagnosis of hypertension. No signs or symptoms of hypertension or hypotension. She reports that she is under a lot of stress currently. Poor lifestyle, including high salt foods and snacks and poor sleep hygiene. At this time no home blood pressure monitoring. O: VS: BP (!) 150/95 (Site: Right Lower Arm, Position: Sitting, Cuff Size: Medium Adult)   Pulse 83   Temp 98.3 °F (36.8 °C) (Temporal)   Ht 5' 2\" (1.575 m)   Wt (!) 306 lb (138.8 kg)   SpO2 99%   BMI 55.97 kg/m²    General: NAD. Elevated blood pressure as above. She is morbidly obese. CV:  RRR, no gallops, rubs, or murmurs   Resp: CTAB no R/R/W   Abd:  Soft, nontender, no masses    Ext:  no C/C/E    Assessment / Plan:      Stephen Patel was seen today for menstrual problem. Diagnoses and all orders for this visit:      1. Menorrhagia with irregular cycle  · Refill OCP, continue to follow with OBGYN  - Norethin Ace-Eth Estrad-FE 1-20 MG-MCG(24) TABS; Take 1 tablet by mouth daily  Dispense: 28 tablet; Refill: 3     2. Elevated blood pressure reading  · Advised on sleep and diet recommendations, information provided  · Monitor at home        Additional plan and future considerations:        Return to Office: Return in about 4 weeks (around 1/6/2022) for HTN. Assessment/Plan:  1. Menorrhagia: Continue OCP per PCP. Refills today.   2.  Elevated blood pressure: Transient/intermittent. Will monitor, both at home and in the office at this time without initiating treatment. Verbal and written information regarding optimal lifestyle provided. Return in about 4 weeks (around 1/6/2022) for HTN. 4 to 6 weeks to check response of symptoms to OCP as well as monitoring blood pressure. Attending Physician Statement  I have discussed the case, including pertinent history and exam findings with the resident. I agree with the documented assessment and plan.          Ileana Arnold MD

## 2021-12-09 NOTE — PATIENT INSTRUCTIONS
Patient Education        Elevated Blood Pressure: Care Instructions  Your Care Instructions    Blood pressure is a measure of how hard the blood pushes against the walls of your arteries. It's normal for blood pressure to go up and down throughout the day. But if it stays up over time, you have high blood pressure. Two numbers tell you your blood pressure. The first number is the systolic pressure. It shows how hard the blood pushes when your heart is pumping. The second number is the diastolic pressure. It shows how hard the blood pushes between heartbeats, when your heart is relaxed and filling with blood. An ideal blood pressure in adults is less than 120/80 (say \"120 over 80\"). High blood pressure is 140/90 or higher. You have high blood pressure if your top number is 140 or higher or your bottom number is 90 or higher, or both. The main test for high blood pressure is simple, fast, and painless. To diagnose high blood pressure, your doctor will test your blood pressure at different times. After testing your blood pressure, your doctor may ask you to test it again when you are home. If you are diagnosed with high blood pressure, you can work with your doctor to make a long-term plan to manage it. Follow-up care is a key part of your treatment and safety. Be sure to make and go to all appointments, and call your doctor if you are having problems. It's also a good idea to know your test results and keep a list of the medicines you take. How can you care for yourself at home? · Do not smoke. Smoking increases your risk for heart attack and stroke. If you need help quitting, talk to your doctor about stop-smoking programs and medicines. These can increase your chances of quitting for good. · Stay at a healthy weight. · Try to limit how much sodium you eat to less than 2,300 milligrams (mg) a day. Your doctor may ask you to try to eat less than 1,500 mg a day. · Be physically active.  Get at least 30 minutes of exercise on most days of the week. Walking is a good choice. You also may want to do other activities, such as running, swimming, cycling, or playing tennis or team sports. · Avoid or limit alcohol. Talk to your doctor about whether you can drink any alcohol. · Eat plenty of fruits, vegetables, and low-fat dairy products. Eat less saturated and total fats. · Learn how to check your blood pressure at home. When should you call for help? Call your doctor now or seek immediate medical care if:    · Your blood pressure is much higher than normal (such as 180/110 or higher).     · You think high blood pressure is causing symptoms such as:  ¨ Severe headache. ¨ Blurry vision.    Watch closely for changes in your health, and be sure to contact your doctor if:    · You do not get better as expected. Where can you learn more? Go to https://[email or web address] [employer].Consumer Agent Portal (CAP). org and sign in to your [employer] account. Enter O574 in the [employer] box to learn more about \"Elevated Blood Pressure: Care Instructions. \"     If you do not have an account, please click on the \"Sign Up Now\" link. Current as of: May 10, 2017  Content Version: 11.6  © 6407-1179 [employer]. Care instructions adapted under license by [facility] ([facility]). If you have questions about a medical condition or this instruction, always ask your healthcare professional. [address] any warranty or liability for your use of this information.

## 2021-12-14 ENCOUNTER — HOSPITAL ENCOUNTER (OUTPATIENT)
Dept: ULTRASOUND IMAGING | Age: 26
Discharge: HOME | End: 2021-12-16
Payer: MEDICARE

## 2021-12-14 DIAGNOSIS — E28.2 PCOS (POLYCYSTIC OVARIAN SYNDROME): ICD-10-CM

## 2021-12-14 DIAGNOSIS — R93.89 THICKENED ENDOMETRIUM: ICD-10-CM

## 2021-12-14 DIAGNOSIS — N93.9 ABNORMAL UTERINE BLEEDING (AUB): ICD-10-CM

## 2021-12-14 PROCEDURE — 76830 TRANSVAGINAL US NON-OB: CPT

## 2021-12-15 ENCOUNTER — PROCEDURE VISIT (OUTPATIENT)
Dept: OBGYN | Age: 26
End: 2021-12-15
Payer: MEDICARE

## 2021-12-15 VITALS
WEIGHT: 293 LBS | DIASTOLIC BLOOD PRESSURE: 78 MMHG | BODY MASS INDEX: 53.92 KG/M2 | HEART RATE: 88 BPM | SYSTOLIC BLOOD PRESSURE: 132 MMHG | HEIGHT: 62 IN | RESPIRATION RATE: 16 BRPM

## 2021-12-15 DIAGNOSIS — N93.9 ABNORMAL UTERINE BLEEDING (AUB): Primary | ICD-10-CM

## 2021-12-15 LAB
CONTROL: NORMAL
PREGNANCY TEST URINE, POC: NORMAL

## 2021-12-15 PROCEDURE — 99214 OFFICE O/P EST MOD 30 MIN: CPT | Performed by: OBSTETRICS & GYNECOLOGY

## 2021-12-15 PROCEDURE — G8417 CALC BMI ABV UP PARAM F/U: HCPCS | Performed by: OBSTETRICS & GYNECOLOGY

## 2021-12-15 PROCEDURE — 58100 BIOPSY OF UTERUS LINING: CPT | Performed by: OBSTETRICS & GYNECOLOGY

## 2021-12-15 PROCEDURE — 1036F TOBACCO NON-USER: CPT | Performed by: OBSTETRICS & GYNECOLOGY

## 2021-12-15 PROCEDURE — 81025 URINE PREGNANCY TEST: CPT | Performed by: OBSTETRICS & GYNECOLOGY

## 2021-12-15 PROCEDURE — G8427 DOCREV CUR MEDS BY ELIG CLIN: HCPCS | Performed by: OBSTETRICS & GYNECOLOGY

## 2021-12-15 PROCEDURE — G8484 FLU IMMUNIZE NO ADMIN: HCPCS | Performed by: OBSTETRICS & GYNECOLOGY

## 2021-12-15 PROCEDURE — 99213 OFFICE O/P EST LOW 20 MIN: CPT | Performed by: OBSTETRICS & GYNECOLOGY

## 2021-12-15 NOTE — PROGRESS NOTES
Endo Bx done today. Preg test was negative  Specimen sent to pathology  Return in 2 weeks for results.

## 2021-12-15 NOTE — PROGRESS NOTES
Endometrial biopsy Procedure Note    Yair Villa    12/15/2021               Patient's last menstrual period was 07/15/2021.     30 y.o. Endometrial biopsy    Indications:  AUB    30 y.o. female  No obstetric history on file. presents for evaluation of AUB. She had regular periods until a little over a year ago where she started to have them q 1-2 months. She also has had abnormal hair growth on her chin for 2 years. Then in July, she started her period and it didn't stop, with heavy bleeding every day. In September she saw her PCP who ordered labs and [address]. US showed ES of 14 mm despite the bleeding. Her prolactin was minimally elevated. Remainder of labs, HgbA1c, and lipid panel were normal. Since starting her ocps, she states she has only had spotting irregularly. She also has an occasional stabbing pain and is concerned if this is normal. SHe is unsure if it is associated with spotting. She is virginal. Repeat prolactin normal. She has had bleeding with dime sized clots since last visit, irregular. Denies heavy bleeding. Anesthesia:  None    Pregnancy test: negative     Procedural Technique:  Prema Dc is here for an endometrial biopsy. Risks and benefits discussed and consents signed. Endometrial biopsy:    The patient was positioned comfortably on the exam table in the dorsal lithotomy position. Speculum was placed in the vagina. The cervix was visualized, cleansed with betadine, and grasped w/ a single tooth tenaculum. Endometrial biopsy was performed with the [place]. Tissue was obtained and sent to pathology. Scant tissue noted in the container. Excellent hemostasis was noted. The patient tolerated the procedure with moderate discomfort. Complications:  None. Assessment:   Diagnosis Orders   1. Abnormal uterine bleeding (AUB)           Recommendations: The patient will return for follow-up in 2 weeks for results review.   Patient to call for any heavy bleeding, severe pain, or fevers.

## 2022-01-06 ENCOUNTER — OFFICE VISIT (OUTPATIENT)
Dept: OBGYN | Age: 27
End: 2022-01-06
Payer: MEDICARE

## 2022-01-06 VITALS
BODY MASS INDEX: 53.92 KG/M2 | SYSTOLIC BLOOD PRESSURE: 139 MMHG | HEART RATE: 88 BPM | WEIGHT: 293 LBS | DIASTOLIC BLOOD PRESSURE: 69 MMHG | HEIGHT: 62 IN

## 2022-01-06 DIAGNOSIS — E28.2 PCOS (POLYCYSTIC OVARIAN SYNDROME): Primary | ICD-10-CM

## 2022-01-06 DIAGNOSIS — N93.9 ABNORMAL UTERINE BLEEDING (AUB): ICD-10-CM

## 2022-01-06 DIAGNOSIS — N92.1 MENORRHAGIA WITH IRREGULAR CYCLE: ICD-10-CM

## 2022-01-06 PROCEDURE — 99213 OFFICE O/P EST LOW 20 MIN: CPT | Performed by: OBSTETRICS & GYNECOLOGY

## 2022-01-06 PROCEDURE — G8484 FLU IMMUNIZE NO ADMIN: HCPCS | Performed by: OBSTETRICS & GYNECOLOGY

## 2022-01-06 PROCEDURE — G8417 CALC BMI ABV UP PARAM F/U: HCPCS | Performed by: OBSTETRICS & GYNECOLOGY

## 2022-01-06 PROCEDURE — 1036F TOBACCO NON-USER: CPT | Performed by: OBSTETRICS & GYNECOLOGY

## 2022-01-06 PROCEDURE — 99212 OFFICE O/P EST SF 10 MIN: CPT | Performed by: OBSTETRICS & GYNECOLOGY

## 2022-01-06 PROCEDURE — G8427 DOCREV CUR MEDS BY ELIG CLIN: HCPCS | Performed by: OBSTETRICS & GYNECOLOGY

## 2022-01-06 RX ORDER — NORETHINDRONE ACETATE AND ETHINYL ESTRADIOL 1MG-20(24)
KIT ORAL
Qty: 28 TABLET | Refills: 3 | Status: SHIPPED
Start: 2022-01-06 | End: 2022-05-16 | Stop reason: SDUPTHER

## 2022-01-06 NOTE — PROGRESS NOTES
Patient alert and pleasant with no complaints  Here today for AUB follow up   Discharge instructions have been discussed with the patient. Patient advised to call our office with any questions or concerns. Voiced understanding.

## 2022-01-06 NOTE — PROGRESS NOTES
HISTORY OF PRESENT ILLNESS:    Pt presents for follow up for f/u for AUB. She had regular periods until a little over a year ago where she started to have them q 1-2 months. She also has had abnormal hair growth on her chin for 2 years. Then in July, she started her period and it didn't stop, with heavy bleeding every day. In September she saw her PCP who ordered labs and [place]. US showed ES of 14 mm despite the bleeding. Her prolactin was minimally elevated. Remainder of labs, HgbA1c, and lipid panel were normal. Since starting her ocps, she states she has only had spotting irregularly. She also has an occasional stabbing pain and is concerned if this is normal. SHe is unsure if it is associated with spotting. She is virginal. Repeat prolactin normal. EBX wnl. Pt started on new ocp. Not currently having an bleeding. Past Medical History:   Past Medical History:   Diagnosis Date    Obesity     PCOS (polycystic ovarian syndrome)                                              OB History    Para Term  AB Living   0 0 0 0 0 0   SAB IAB Ectopic Molar Multiple Live Births   0 0 0 0 0 0         Past Surgical History:   Past Surgical History:   Procedure Laterality Date    ARM SURGERY      CARPAL TUNNEL RELEASE      TONSILLECTOMY      WISDOM TOOTH EXTRACTION          Allergies: Patient has no known allergies. Medications:   Current Outpatient Medications   Medication Sig Dispense Refill    Norethin Ace-Eth Estrad-FE 1-20 MG-MCG(24) TABS Take 1 tablet by mouth daily 28 tablet 3    escitalopram (LEXAPRO) 5 MG tablet Take 1 tablet by mouth daily 30 tablet 5     No current facility-administered medications for this visit.          Social History:   Social History     Tobacco Use    Smoking status: Never Smoker    Smokeless tobacco: Never Used   Substance Use Topics    Alcohol use: Yes     Comment: OCC        Family History:   Family History   Problem Relation Age of Onset    Breast Cancer Maternal Grandmother 72       REVIEW OF SYSTEMS:    Constitutional: negative  HEENT: negative  Breast: negative  Respiratory: negative  Cardiovascular: negative  Gastrointestinal: negative  Genitourinary:negative  Integument: negative  Neurological: negative  Endocrine: negative          PHYSICAL EXAM  /69 (Position: Sitting)   Pulse 88   Ht 5' 2\" (1.575 m)   Wt (!) 304 lb (137.9 kg)   BMI 55.60 kg/m²   No LMP recorded. General appearance: alert, cooperative and in no acute distress. Head: NCAT   Psych: No acute distress, mood and affect full range  Neuro: Alert and oriented, no focal deficits          ASSESSMENT :   Diagnosis Orders   1. PCOS (polycystic ovarian syndrome)  US NON OB TRANSVAGINAL   2. Abnormal uterine bleeding (AUB)  US NON OB TRANSVAGINAL        PLAN:    Return in about 6 weeks (around 2/17/2022) for Review results. No orders of the defined types were placed in this encounter.       Orders Placed This Encounter   Procedures    US NON OB TRANSVAGINAL     Standing Status:   Future     Standing Expiration Date:   1/6/2023     Order Specific Question:   Reason for exam:     Answer:   thickened endometrioum, right ovarian cyst         Electronically signed by Parth Alexander DO on 1/6/22

## 2022-02-01 ENCOUNTER — HOSPITAL ENCOUNTER (OUTPATIENT)
Dept: ULTRASOUND IMAGING | Age: 27
Discharge: HOME | End: 2022-02-03
Payer: MEDICARE

## 2022-02-01 DIAGNOSIS — E28.2 PCOS (POLYCYSTIC OVARIAN SYNDROME): ICD-10-CM

## 2022-02-01 DIAGNOSIS — N93.9 ABNORMAL UTERINE BLEEDING (AUB): ICD-10-CM

## 2022-02-01 PROCEDURE — 76830 TRANSVAGINAL US NON-OB: CPT

## 2022-02-26 DIAGNOSIS — Z13.31 POSITIVE DEPRESSION SCREENING: ICD-10-CM

## 2022-02-28 RX ORDER — ESCITALOPRAM OXALATE 5 MG/1
TABLET ORAL
Qty: 30 TABLET | Refills: 5 | Status: SHIPPED
Start: 2022-02-28 | End: 2022-09-16 | Stop reason: ALTCHOICE

## 2022-03-10 ENCOUNTER — OFFICE VISIT (OUTPATIENT)
Dept: OBGYN | Age: 27
End: 2022-03-10
Payer: MEDICARE

## 2022-03-10 VITALS
HEART RATE: 93 BPM | SYSTOLIC BLOOD PRESSURE: 126 MMHG | DIASTOLIC BLOOD PRESSURE: 72 MMHG | WEIGHT: 293 LBS | BODY MASS INDEX: 55.42 KG/M2

## 2022-03-10 DIAGNOSIS — N93.9 ABNORMAL UTERINE BLEEDING (AUB): ICD-10-CM

## 2022-03-10 DIAGNOSIS — E28.2 PCOS (POLYCYSTIC OVARIAN SYNDROME): Primary | ICD-10-CM

## 2022-03-10 PROCEDURE — G8427 DOCREV CUR MEDS BY ELIG CLIN: HCPCS | Performed by: OBSTETRICS & GYNECOLOGY

## 2022-03-10 PROCEDURE — 99213 OFFICE O/P EST LOW 20 MIN: CPT | Performed by: OBSTETRICS & GYNECOLOGY

## 2022-03-10 PROCEDURE — G8417 CALC BMI ABV UP PARAM F/U: HCPCS | Performed by: OBSTETRICS & GYNECOLOGY

## 2022-03-10 PROCEDURE — 1036F TOBACCO NON-USER: CPT | Performed by: OBSTETRICS & GYNECOLOGY

## 2022-03-10 PROCEDURE — G8484 FLU IMMUNIZE NO ADMIN: HCPCS | Performed by: OBSTETRICS & GYNECOLOGY

## 2022-03-10 PROCEDURE — 99212 OFFICE O/P EST SF 10 MIN: CPT | Performed by: OBSTETRICS & GYNECOLOGY

## 2022-03-10 NOTE — PROGRESS NOTES
HISTORY OF PRESENT ILLNESS:    Pt presents for follow up for f/u for AUB. She had regular periods until a little over a year ago where she started to have them q 1-2 months. She also has had abnormal hair growth on her chin for 2 years. Then in July, she started her period and it didn't stop, with heavy bleeding every day. In September she saw her PCP who ordered labs and [address]. US showed ES of 14 mm despite the bleeding. Her prolactin was minimally elevated. Remainder of labs, HgbA1c, and lipid panel were normal. Since starting her ocps, she states she has only had spotting irregularly. She also has an occasional stabbing pain and is concerned if this is normal. SHe is unsure if it is associated with spotting. She is virginal. Repeat prolactin normal. EBX wnl. Pt had repeat US 22 which revealed an ES of 5 mm and resolution of simple cyst. US did show PCOS ovaries. Pt doing well on OCP, only a little bit of breakthrough spotting. Past Medical History:   Past Medical History:   Diagnosis Date    Obesity     PCOS (polycystic ovarian syndrome)                                              OB History    Para Term  AB Living   0 0 0 0 0 0   SAB IAB Ectopic Molar Multiple Live Births   0 0 0 0 0 0         Past Surgical History:   Past Surgical History:   Procedure Laterality Date    ARM SURGERY      CARPAL TUNNEL RELEASE      TONSILLECTOMY      WISDOM TOOTH EXTRACTION          Allergies: Patient has no known allergies. Medications:   Current Outpatient Medications   Medication Sig Dispense Refill    escitalopram (LEXAPRO) 5 MG tablet TAKE 1 TABLET BY MOUTH EVERY DAY 30 tablet 5    BLISOVI 24 FE 1-20 MG-MCG(24) TABS TAKE 1 TABLET BY MOUTH EVERY DAY 28 tablet 3     No current facility-administered medications for this visit.          Social History:   Social History     Tobacco Use    Smoking status: Never Smoker    Smokeless tobacco: Never Used   Substance Use Topics    Alcohol use: Yes     Comment: OCC        Family History:   Family History   Problem Relation Age of Onset    Breast Cancer Maternal Grandmother 72       REVIEW OF SYSTEMS:    Constitutional: negative  HEENT: negative  Breast: negative  Respiratory: negative  Cardiovascular: negative  Gastrointestinal: negative  Genitourinary:negative  Integument: negative  Neurological: negative  Endocrine: negative          PHYSICAL EXAM  /72   Pulse 93   Wt (!) 303 lb (137.4 kg)   LMP 03/04/2022   BMI 55.42 kg/m²   Patient's last menstrual period was 03/04/2022. General appearance: alert, cooperative and in no acute distress. Head: NCAT   Psych: No acute distress, mood and affect full range  Neuro: Alert and oriented, no focal deficits          ASSESSMENT :   Diagnosis Orders   1. PCOS (polycystic ovarian syndrome)     2. Abnormal uterine bleeding (AUB)          PLAN:    Return in about 1 year (around 3/10/2023) for Annual exam.      No orders of the defined types were placed in this encounter. No orders of the defined types were placed in this encounter.         Electronically signed by Eugene Baltazar DO on 1/6/22

## 2022-05-15 DIAGNOSIS — N92.1 MENORRHAGIA WITH IRREGULAR CYCLE: ICD-10-CM

## 2022-05-16 RX ORDER — NORETHINDRONE ACETATE AND ETHINYL ESTRADIOL 1MG-20(24)
KIT ORAL
Qty: 28 TABLET | Refills: 3 | Status: SHIPPED
Start: 2022-05-16 | End: 2022-09-13

## 2022-09-13 DIAGNOSIS — N92.1 MENORRHAGIA WITH IRREGULAR CYCLE: ICD-10-CM

## 2022-09-13 RX ORDER — NORETHINDRONE ACETATE AND ETHINYL ESTRADIOL 1MG-20(24)
KIT ORAL
Qty: 28 TABLET | Refills: 0 | Status: SHIPPED
Start: 2022-09-13 | End: 2022-10-18

## 2022-09-14 NOTE — TELEPHONE ENCOUNTER
Last Appointment:  12/9/2021  Future Appointments   Date Time Provider Juan Miguel Reynoso   9/16/2022  1:20 PM Micah Macias MD [place] [facility]

## 2022-09-15 DIAGNOSIS — Z13.31 POSITIVE DEPRESSION SCREENING: ICD-10-CM

## 2022-09-15 NOTE — TELEPHONE ENCOUNTER
Last Appointment:  12/9/2021  Future Appointments   Date Time Provider Juan Miguel Reynoso   9/16/2022  1:20 PM MD [place] [facility]

## 2022-09-16 ENCOUNTER — OFFICE VISIT (OUTPATIENT)
Dept: FAMILY MEDICINE CLINIC | Age: 27
End: 2022-09-16
Payer: MEDICARE

## 2022-09-16 VITALS
OXYGEN SATURATION: 98 % | DIASTOLIC BLOOD PRESSURE: 89 MMHG | TEMPERATURE: 98.1 F | HEART RATE: 82 BPM | BODY MASS INDEX: 53.92 KG/M2 | HEIGHT: 62 IN | WEIGHT: 293 LBS | RESPIRATION RATE: 18 BRPM | SYSTOLIC BLOOD PRESSURE: 127 MMHG

## 2022-09-16 DIAGNOSIS — R03.0 ELEVATED BLOOD PRESSURE READING: ICD-10-CM

## 2022-09-16 DIAGNOSIS — Z13.31 POSITIVE DEPRESSION SCREENING: Primary | ICD-10-CM

## 2022-09-16 PROCEDURE — 1036F TOBACCO NON-USER: CPT | Performed by: STUDENT IN AN ORGANIZED HEALTH CARE EDUCATION/TRAINING PROGRAM

## 2022-09-16 PROCEDURE — G8427 DOCREV CUR MEDS BY ELIG CLIN: HCPCS | Performed by: STUDENT IN AN ORGANIZED HEALTH CARE EDUCATION/TRAINING PROGRAM

## 2022-09-16 PROCEDURE — 99213 OFFICE O/P EST LOW 20 MIN: CPT | Performed by: STUDENT IN AN ORGANIZED HEALTH CARE EDUCATION/TRAINING PROGRAM

## 2022-09-16 PROCEDURE — 99212 OFFICE O/P EST SF 10 MIN: CPT | Performed by: STUDENT IN AN ORGANIZED HEALTH CARE EDUCATION/TRAINING PROGRAM

## 2022-09-16 PROCEDURE — G8417 CALC BMI ABV UP PARAM F/U: HCPCS | Performed by: STUDENT IN AN ORGANIZED HEALTH CARE EDUCATION/TRAINING PROGRAM

## 2022-09-16 RX ORDER — ESCITALOPRAM OXALATE 5 MG/1
TABLET ORAL
Qty: 30 TABLET | Refills: 5 | OUTPATIENT
Start: 2022-09-16

## 2022-09-16 RX ORDER — NORETHINDRONE ACETATE AND ETHINYL ESTRADIOL 1MG-20(24)
KIT ORAL
Qty: 28 TABLET | Status: CANCELLED | OUTPATIENT
Start: 2022-09-16

## 2022-09-16 SDOH — ECONOMIC STABILITY: FOOD INSECURITY: WITHIN THE PAST 12 MONTHS, THE FOOD YOU BOUGHT JUST DIDN'T LAST AND YOU DIDN'T HAVE MONEY TO GET MORE.: NEVER TRUE

## 2022-09-16 SDOH — ECONOMIC STABILITY: TRANSPORTATION INSECURITY
IN THE PAST 12 MONTHS, HAS LACK OF TRANSPORTATION KEPT YOU FROM MEETINGS, WORK, OR FROM GETTING THINGS NEEDED FOR DAILY LIVING?: NO

## 2022-09-16 SDOH — ECONOMIC STABILITY: TRANSPORTATION INSECURITY
IN THE PAST 12 MONTHS, HAS THE LACK OF TRANSPORTATION KEPT YOU FROM MEDICAL APPOINTMENTS OR FROM GETTING MEDICATIONS?: NO

## 2022-09-16 SDOH — ECONOMIC STABILITY: FOOD INSECURITY: WITHIN THE PAST 12 MONTHS, YOU WORRIED THAT YOUR FOOD WOULD RUN OUT BEFORE YOU GOT MONEY TO BUY MORE.: NEVER TRUE

## 2022-09-16 ASSESSMENT — PATIENT HEALTH QUESTIONNAIRE - PHQ9
1. LITTLE INTEREST OR PLEASURE IN DOING THINGS: 0
2. FEELING DOWN, DEPRESSED OR HOPELESS: 1
SUM OF ALL RESPONSES TO PHQ QUESTIONS 1-9: 1
SUM OF ALL RESPONSES TO PHQ9 QUESTIONS 1 & 2: 1

## 2022-09-16 ASSESSMENT — ENCOUNTER SYMPTOMS
ABDOMINAL PAIN: 0
VOMITING: 0
DIARRHEA: 0
COUGH: 0
SHORTNESS OF BREATH: 0
CONSTIPATION: 0
NAUSEA: 0

## 2022-09-16 ASSESSMENT — LIFESTYLE VARIABLES
HOW MANY STANDARD DRINKS CONTAINING ALCOHOL DO YOU HAVE ON A TYPICAL DAY: 1 OR 2
HOW OFTEN DO YOU HAVE A DRINK CONTAINING ALCOHOL: 2-4 TIMES A MONTH

## 2022-09-16 ASSESSMENT — SOCIAL DETERMINANTS OF HEALTH (SDOH): HOW HARD IS IT FOR YOU TO PAY FOR THE VERY BASICS LIKE FOOD, HOUSING, MEDICAL CARE, AND HEATING?: NOT HARD AT ALL

## 2022-09-16 NOTE — PROGRESS NOTES
S: 27 y.o. female here for depression. Lexapro. Improved. No SI or HI. Intermittent vaginal pain. Has had heavy vaginal bleeding, improved with birth control. O: VS: /89 (Site: Right Lower Arm, Position: Sitting, Cuff Size: Medium Adult)   Pulse 82   Temp 98.1 °F (36.7 °C) (Temporal)   Resp 18   Ht 5' 2\" (1.575 m)   Wt 298 lb (135.2 kg)   SpO2 98%   BMI 54.50 kg/m²    General: NAD, alert and interacting appropriately. CV:  RRR, no gallops, rubs, or murmurs    Resp: CTAB   Abd:  Soft, nontender   Ext:  No edema    Impression: depression. Vaginal discomfort, now improved. Plan:   Trial off lexapro. F/u w/ GYN  Plan for flu shot    Attending Physician Statement  I have discussed the case, including pertinent history and exam findings with the resident. I agree with the documented assessment and plan.

## 2022-09-16 NOTE — PROGRESS NOTES
[facility]  FAMILY MEDICINE RESIDENCY PROGRAM  DATE OF VISIT : 2022    Patient : Angelita Vega   Age : 27 y.o.    : 1995   MRN : [id number]   ______________________________________________________________________    Chief Complaint :   Chief Complaint   Patient presents with    Check-Up    Medication Refill       HPI : Angelita Vega is 27 y.o. female who presented to the clinic today for depression and vaginal pain. Depression: Was started on lexapro mg since 2021 due to depressed mood. She felt more isolated at that time due to covid. However now she is able to socialize  and go out more with her friends. She states her mood has been good. Has more energy and motivation to do things. Denies SI/HI. She would like to try off of the lexapro as she does not feel like she needs it anymore. Vaginal pains: occur randomly since starting OCP. Has been on OCPs due to heavy vaginal bleeding and was recently diagnosed with PCOS. Follows with OBGYN. Bleeding has improved tremedously. However, occasionally has \"vaginal pain,\" not associated with any other activity. Lasts for about a minute and resolves on own. Had tetanus shot within past 2 years. Agreeable to obtain flu shot. Initial BP reading in the office was 154/78, repeat was 127/89. Had another elevated reading in 2021. Family history of HTN in father. However, patient states that she does not think it is hereditary and states that it is \"due to father being overweight. \"        Past Medical History :  Past Medical History:   Diagnosis Date    Obesity     PCOS (polycystic ovarian syndrome)      Past Surgical History:   Procedure Laterality Date    ARM SURGERY      CARPAL TUNNEL RELEASE      TONSILLECTOMY      WISDOM TOOTH EXTRACTION         Allergies :   No Known Allergies    Medication List :    Current Outpatient Medications   Medication Sig Dispense Refill    BLISOVI 24 FE 1-20 MG-MCG(24) TABS TAKE 1 TABLET BY MOUTH EVERY DAY 28 tablet 0    escitalopram (LEXAPRO) 5 MG tablet TAKE 1 TABLET BY MOUTH EVERY DAY 30 tablet 5     No current facility-administered medications for this visit. Review of Systems :  Review of Systems   Constitutional:  Negative for chills and fever. Respiratory:  Negative for cough and shortness of breath. Cardiovascular:  Negative for chest pain, palpitations and leg swelling. Gastrointestinal:  Negative for abdominal pain, constipation, diarrhea, nausea and vomiting. Genitourinary:  Negative for dysuria. Neurological:  Negative for dizziness, weakness, light-headedness and headaches.   ______________________________________________________________________    Physical Exam :    Vitals: /89 (Site: Right Lower Arm, Position: Sitting, Cuff Size: Medium Adult)   Pulse 82   Temp 98.1 °F (36.7 °C) (Temporal)   Resp 18   Ht 5' 2\" (1.575 m)   Wt 298 lb (135.2 kg)   SpO2 98%   BMI 54.50 kg/m²   General Appearance: Well developed, awake, alert, oriented, and in NAD  HEENT: NCAT, MMM, no pallor or icterus. Neck: Symmetrical, trachea midline. Chest wall/Lung: CTAB, respirations unlabored. No ronchi/wheezing/rales   Heart: RRR, normal S1 and S2, no murmurs, rubs or gallops. Abdomen: SNTND, bowel sounds present   Extremities: Extremities normal, atraumatic, no cyanosis, clubbing or  edema. Skin: Skin color, texture, turgor normal, no rashes or lesions  Musculokeletal: ROM grossly normal in all joints of extremities, no obvious joint swelling. Lymphnodes: No lymph node enlargement appreciated  Neurologic: Alert&Oriented x3. No focal motor deficits detected. Psychiatric: Normal mood. Normal affect. Normal behavior. ______________________________________________________________________    Assessment & Plan :    1. Positive depression screening  - Mood improved. Patient does not feel like it is due to lexapro  - Will stop lexapro for now     2.  Elevated blood pressure reading  - Has had 2 elevated Bps in past several months   - Discussed with patient that OCP may contribute to her BP as well as family history and obesity. Discussed lifestyle modifications. Patient has been adjusting her eating habits and has lost 5 lbs in past 6 months. Encouraged to continue. - Will monitor BP for now. Discussed possibility of needing BP med vs stopping ocp. 3. HM  - Patient will obtain flu shot at pharmacy as we do not have any in office yet       Return to Office: Return in about 4 weeks (around 10/14/2022) for Elevated BP .     Carl Martinez MD  Case discussed with Dr. Andrez Young

## 2022-10-16 DIAGNOSIS — N92.1 MENORRHAGIA WITH IRREGULAR CYCLE: ICD-10-CM

## 2022-10-17 NOTE — TELEPHONE ENCOUNTER
Last Appointment:  9/16/2022  Future Appointments   Date Time Provider Juan Miguel Reynoso   10/27/2022  1:00 PM MD [place] [facility]

## 2022-10-18 RX ORDER — NORETHINDRONE ACETATE AND ETHINYL ESTRADIOL 1MG-20(24)
KIT ORAL
Qty: 28 TABLET | Refills: 0 | Status: SHIPPED | OUTPATIENT
Start: 2022-10-18

## 2022-10-27 ENCOUNTER — OFFICE VISIT (OUTPATIENT)
Dept: FAMILY MEDICINE CLINIC | Age: 27
End: 2022-10-27
Payer: MEDICARE

## 2022-10-27 VITALS
WEIGHT: 287 LBS | OXYGEN SATURATION: 97 % | HEIGHT: 62 IN | HEART RATE: 92 BPM | RESPIRATION RATE: 18 BRPM | BODY MASS INDEX: 52.81 KG/M2 | SYSTOLIC BLOOD PRESSURE: 153 MMHG | DIASTOLIC BLOOD PRESSURE: 97 MMHG | TEMPERATURE: 97.3 F

## 2022-10-27 DIAGNOSIS — F33.0 MILD EPISODE OF RECURRENT MAJOR DEPRESSIVE DISORDER (HCC): ICD-10-CM

## 2022-10-27 DIAGNOSIS — I10 PRIMARY HYPERTENSION: Primary | ICD-10-CM

## 2022-10-27 PROCEDURE — 99213 OFFICE O/P EST LOW 20 MIN: CPT | Performed by: STUDENT IN AN ORGANIZED HEALTH CARE EDUCATION/TRAINING PROGRAM

## 2022-10-27 PROCEDURE — G8484 FLU IMMUNIZE NO ADMIN: HCPCS | Performed by: STUDENT IN AN ORGANIZED HEALTH CARE EDUCATION/TRAINING PROGRAM

## 2022-10-27 PROCEDURE — 1036F TOBACCO NON-USER: CPT | Performed by: STUDENT IN AN ORGANIZED HEALTH CARE EDUCATION/TRAINING PROGRAM

## 2022-10-27 PROCEDURE — 99212 OFFICE O/P EST SF 10 MIN: CPT | Performed by: STUDENT IN AN ORGANIZED HEALTH CARE EDUCATION/TRAINING PROGRAM

## 2022-10-27 PROCEDURE — G8417 CALC BMI ABV UP PARAM F/U: HCPCS | Performed by: STUDENT IN AN ORGANIZED HEALTH CARE EDUCATION/TRAINING PROGRAM

## 2022-10-27 PROCEDURE — G8427 DOCREV CUR MEDS BY ELIG CLIN: HCPCS | Performed by: STUDENT IN AN ORGANIZED HEALTH CARE EDUCATION/TRAINING PROGRAM

## 2022-10-27 PROCEDURE — 3074F SYST BP LT 130 MM HG: CPT | Performed by: STUDENT IN AN ORGANIZED HEALTH CARE EDUCATION/TRAINING PROGRAM

## 2022-10-27 PROCEDURE — 3078F DIAST BP <80 MM HG: CPT | Performed by: STUDENT IN AN ORGANIZED HEALTH CARE EDUCATION/TRAINING PROGRAM

## 2022-10-27 RX ORDER — ESCITALOPRAM OXALATE 5 MG/1
5 TABLET ORAL DAILY
Qty: 30 TABLET | Refills: 4 | Status: SHIPPED | OUTPATIENT
Start: 2022-10-27

## 2022-10-27 RX ORDER — AMLODIPINE BESYLATE 2.5 MG/1
2.5 TABLET ORAL DAILY
Qty: 30 TABLET | Refills: 4 | Status: SHIPPED
Start: 2022-10-27 | End: 2022-11-20

## 2022-10-27 ASSESSMENT — ENCOUNTER SYMPTOMS
VOMITING: 0
ABDOMINAL PAIN: 0
COUGH: 0
NAUSEA: 0
SHORTNESS OF BREATH: 0
CONSTIPATION: 0
DIARRHEA: 0

## 2022-10-27 NOTE — PROGRESS NOTES
Patient is a 58-year-old female here for follow-up of depression, PCOS, sore throat and elevated blood pressure. She states that her mood has been getting worse since she was stopped off of the Lexapro. She endorses decreased energy, decreased motivation, decreased appetite and has been sleeping more. Has lost 11 pounds in the past month unintentionally. She did have 1 episode of thinking that she should be better off dead, but denied any plan. She stated that she spoke to her friend and after she had these thoughts and felt better afterwards. She would like to restart her Lexapro and is interested in trying counseling. Was previously in family counseling as a child when her parents were undergoing a divorce. However she did not state that it was helpful at that time. She states that her bleeding has become heavier in the past month. She is using 2 pads and 1 day. She has had some more cramping. Will occasionally use Tylenol. Blood pressure continues to be elevated. She has not been following a specific diet or exercising. She states that she is now agreeable to try an antihypertensive. Also had a 1 day history of sore throat. Associate with congestion, sinus pressure. Denies cough, fever, chills. History of allergies. Not on any medications. Blood pressure (!) 153/97, pulse 92, temperature 97.3 °F (36.3 °C), temperature source Temporal, resp. rate 18, height 5' 2\" (1.575 m), weight 287 lb (130.2 kg), SpO2 97 %, not currently breastfeeding. HEENT WNL oropharynx clear. No LAD. Heart regular    Lungs clear    abd non-tender      No edema    Pulses intact     Impression:  Depression, PCOS, sore throat, hypertension    Will restart Lexapro 5 mg. We will schedule for counseling cotreatment with Dr. Trent Mane. Discussed safety plan on what to do if having thoughts of harming herself. Discussed different options of birth control to help with her vaginal bleeding.   Patient would like to continue OCPs for now. Instructed to use Tylenol or ibuprofen for cramping. Sore throat likely secondary to viral URI. Symptomatic treatment. We will start amlodipine 2.5 mg daily. Counseled on side effects. Attending Physician Statement  I have discussed the case, including pertinent history and exam findings with the resident. I agree with the documented assessment and plan.

## 2022-10-27 NOTE — PROGRESS NOTES
[facility]  FAMILY MEDICINE RESIDENCY PROGRAM  DATE OF VISIT : 10/27/2022    Patient : Breanna Salinas   Age : 27 y.o.    : 1995   MRN : [id number]   ______________________________________________________________________    Chief Complaint :   Chief Complaint   Patient presents with    Discuss Medications    1 Month Follow-Up       HPI : Breanna Salinas is 27 y.o. female who presented to the clinic today for follow-up of depression and elevated blood pressure. Depression: believes that it was a bad idea to stop her medication. Has decreased mood, energy, motivation, appetite. Has lost 11 pounds in the past month. Had one episode of thoughts of harming herself, but denies plan. Denies current SI/HI. BP elevated x2 during this visit. Not following a specific diet and not exercising. States that she has been bleeding more. She states that it is \"heavy\". However she is only going through 2 pads per day. She is experiencing lower abdominal cramping. Sore throat x1 day. A/w congestion, sinus pressure. Denies cough, fevere, chills. H/o allergies. Not on any medications. Past Medical History :  Past Medical History:   Diagnosis Date    Obesity     PCOS (polycystic ovarian syndrome)      Past Surgical History:   Procedure Laterality Date    ARM SURGERY      CARPAL TUNNEL RELEASE      TONSILLECTOMY      WISDOM TOOTH EXTRACTION         Allergies :   No Known Allergies    Medication List :    Current Outpatient Medications   Medication Sig Dispense Refill    BLISOVI 24 FE 1-20 MG-MCG(24) TABS TAKE 1 TABLET BY MOUTH EVERY DAY 28 tablet 0     No current facility-administered medications for this visit. Review of Systems :  Review of Systems   Constitutional:  Negative for chills and fever. Respiratory:  Negative for cough and shortness of breath. Cardiovascular:  Negative for chest pain, palpitations and leg swelling.    Gastrointestinal:  Negative for abdominal pain, constipation, diarrhea, nausea and vomiting. Genitourinary:  Negative for dysuria. Neurological:  Negative for dizziness, weakness, light-headedness and headaches.   ______________________________________________________________________    Physical Exam :    Vitals: BP (!) 153/97 (Site: Right Lower Arm, Position: Sitting, Cuff Size: Medium Adult)   Pulse 92   Temp 97.3 °F (36.3 °C) (Temporal)   Resp 18   Ht 5' 2\" (1.575 m)   Wt 287 lb (130.2 kg)   SpO2 97%   BMI 52.49 kg/m²   General Appearance: Well developed, awake, alert, oriented, and in NAD  HEENT: NCAT, MMM, no pallor or icterus. Nonerythematous, no exudate (h/o tonsillectomy)   Neck: Symmetrical, trachea midline. Chest wall/Lung: CTAB, respirations unlabored. No ronchi/wheezing/rales   Heart: RRR, normal S1 and S2, no murmurs, rubs or gallops. Abdomen: SNTND, bowel sounds present   Extremities: Extremities normal, atraumatic, no cyanosis, clubbing or  edema. Skin: Skin color, texture, turgor normal, no rashes or lesions  Musculokeletal: ROM grossly normal in all joints of extremities, no obvious joint swelling. Lymphnodes: No lymph node enlargement appreciated  Neurologic: Alert&Oriented x3. No focal motor deficits detected. Psychiatric: Normal mood. Normal affect. Normal behavior. ______________________________________________________________________    Assessment & Plan :    1. Depression  - Mood worsened off medication. Will restart lexapro 5 mg  - We will do cotreatment with Dr. Lisseth Alatorre to see if counseling is something that she wants to continue with. 2. Hypertension   - Has had 2 elevated Bps in past several months   - Discussed with patient that OCP may contribute to her BP as well as family history and obesity. Discussed lifestyle modifications.  - Start Norvasc 2.5 mg daily    3. Sore throat  -May be due to viral URI versus allergic rhinitis  - Symptomatic treat    4.   PCOS  - Has been having more bleeding and abdominal cramping in the past couple weeks  - Continue OCPs  - Tylenol or ibuprofen for abdominal cramping      Return to Office: Return in about 4 weeks (around 11/24/2022) for cotreatment with Dr. Brenda Vickers.     Edgardo Goodpasture, MD  Case discussed with Dr. Marsha Bowling

## 2022-10-31 PROBLEM — I10 PRIMARY HYPERTENSION: Status: ACTIVE | Noted: 2022-10-31

## 2022-10-31 PROBLEM — R03.0 ELEVATED BLOOD PRESSURE READING: Status: RESOLVED | Noted: 2021-10-09 | Resolved: 2022-10-31

## 2022-11-11 DIAGNOSIS — N92.1 MENORRHAGIA WITH IRREGULAR CYCLE: ICD-10-CM

## 2022-11-11 NOTE — TELEPHONE ENCOUNTER
Last Appointment:  10/27/2022  Future Appointments  11/28/2022 9:00 AM    MD Jessie Dimas [facility]        [facility]

## 2022-11-15 RX ORDER — NORETHINDRONE ACETATE AND ETHINYL ESTRADIOL 1MG-20(24)
KIT ORAL
Qty: 28 TABLET | Refills: 0 | Status: SHIPPED | OUTPATIENT
Start: 2022-11-15

## 2022-11-18 DIAGNOSIS — I10 PRIMARY HYPERTENSION: ICD-10-CM

## 2022-11-20 RX ORDER — AMLODIPINE BESYLATE 2.5 MG/1
TABLET ORAL
Qty: 30 TABLET | Refills: 4 | Status: SHIPPED | OUTPATIENT
Start: 2022-11-20

## 2022-11-28 ENCOUNTER — OFFICE VISIT (OUTPATIENT)
Dept: FAMILY MEDICINE CLINIC | Age: 27
End: 2022-11-28
Payer: MEDICARE

## 2022-11-28 VITALS
HEART RATE: 82 BPM | HEIGHT: 62 IN | BODY MASS INDEX: 53.92 KG/M2 | TEMPERATURE: 97.7 F | WEIGHT: 293 LBS | OXYGEN SATURATION: 99 % | SYSTOLIC BLOOD PRESSURE: 130 MMHG | RESPIRATION RATE: 16 BRPM | DIASTOLIC BLOOD PRESSURE: 80 MMHG

## 2022-11-28 DIAGNOSIS — I10 PRIMARY HYPERTENSION: ICD-10-CM

## 2022-11-28 DIAGNOSIS — F33.0 MILD EPISODE OF RECURRENT MAJOR DEPRESSIVE DISORDER (HCC): Primary | ICD-10-CM

## 2022-11-28 DIAGNOSIS — E66.01 CLASS 3 SEVERE OBESITY DUE TO EXCESS CALORIES WITHOUT SERIOUS COMORBIDITY WITH BODY MASS INDEX (BMI) OF 50.0 TO 59.9 IN ADULT (HCC): ICD-10-CM

## 2022-11-28 PROCEDURE — 3074F SYST BP LT 130 MM HG: CPT | Performed by: STUDENT IN AN ORGANIZED HEALTH CARE EDUCATION/TRAINING PROGRAM

## 2022-11-28 PROCEDURE — 99213 OFFICE O/P EST LOW 20 MIN: CPT | Performed by: STUDENT IN AN ORGANIZED HEALTH CARE EDUCATION/TRAINING PROGRAM

## 2022-11-28 PROCEDURE — G8417 CALC BMI ABV UP PARAM F/U: HCPCS | Performed by: STUDENT IN AN ORGANIZED HEALTH CARE EDUCATION/TRAINING PROGRAM

## 2022-11-28 PROCEDURE — 1036F TOBACCO NON-USER: CPT | Performed by: STUDENT IN AN ORGANIZED HEALTH CARE EDUCATION/TRAINING PROGRAM

## 2022-11-28 PROCEDURE — 99212 OFFICE O/P EST SF 10 MIN: CPT | Performed by: STUDENT IN AN ORGANIZED HEALTH CARE EDUCATION/TRAINING PROGRAM

## 2022-11-28 PROCEDURE — G8427 DOCREV CUR MEDS BY ELIG CLIN: HCPCS | Performed by: STUDENT IN AN ORGANIZED HEALTH CARE EDUCATION/TRAINING PROGRAM

## 2022-11-28 PROCEDURE — 3078F DIAST BP <80 MM HG: CPT | Performed by: STUDENT IN AN ORGANIZED HEALTH CARE EDUCATION/TRAINING PROGRAM

## 2022-11-28 PROCEDURE — G8482 FLU IMMUNIZE ORDER/ADMIN: HCPCS | Performed by: STUDENT IN AN ORGANIZED HEALTH CARE EDUCATION/TRAINING PROGRAM

## 2022-11-28 ASSESSMENT — PATIENT HEALTH QUESTIONNAIRE - PHQ9
2. FEELING DOWN, DEPRESSED OR HOPELESS: 0
4. FEELING TIRED OR HAVING LITTLE ENERGY: 2
3. TROUBLE FALLING OR STAYING ASLEEP: 0
1. LITTLE INTEREST OR PLEASURE IN DOING THINGS: 1
7. TROUBLE CONCENTRATING ON THINGS, SUCH AS READING THE NEWSPAPER OR WATCHING TELEVISION: 1
SUM OF ALL RESPONSES TO PHQ QUESTIONS 1-9: 6
10. IF YOU CHECKED OFF ANY PROBLEMS, HOW DIFFICULT HAVE THESE PROBLEMS MADE IT FOR YOU TO DO YOUR WORK, TAKE CARE OF THINGS AT HOME, OR GET ALONG WITH OTHER PEOPLE: 0
6. FEELING BAD ABOUT YOURSELF - OR THAT YOU ARE A FAILURE OR HAVE LET YOURSELF OR YOUR FAMILY DOWN: 0
SUM OF ALL RESPONSES TO PHQ QUESTIONS 1-9: 6
5. POOR APPETITE OR OVEREATING: 2
SUM OF ALL RESPONSES TO PHQ QUESTIONS 1-9: 6
SUM OF ALL RESPONSES TO PHQ QUESTIONS 1-9: 6
9. THOUGHTS THAT YOU WOULD BE BETTER OFF DEAD, OR OF HURTING YOURSELF: 0
8. MOVING OR SPEAKING SO SLOWLY THAT OTHER PEOPLE COULD HAVE NOTICED. OR THE OPPOSITE, BEING SO FIGETY OR RESTLESS THAT YOU HAVE BEEN MOVING AROUND A LOT MORE THAN USUAL: 0
SUM OF ALL RESPONSES TO PHQ9 QUESTIONS 1 & 2: 1

## 2022-11-28 ASSESSMENT — ENCOUNTER SYMPTOMS
SHORTNESS OF BREATH: 0
CONSTIPATION: 0
COUGH: 0
ABDOMINAL PAIN: 0
DIARRHEA: 0
NAUSEA: 0
VOMITING: 0

## 2022-11-28 NOTE — PROGRESS NOTES
S: 27 y.o. female here for depression. Lexapro 5 helping. Feels a little exhausted, works 2 jobs. HTN controlled. Amlodipine   Obesity not controlled. No exercise. O: VS: /80 (Site: Left Upper Arm)   Pulse 82   Temp 97.7 °F (36.5 °C)   Resp 16   Ht 5' 2\" (1.575 m)   Wt (!) 301 lb 11.2 oz (136.9 kg)   SpO2 99%   BMI 55.18 kg/m²    General: NAD, alert and interacting appropriately. Impression: depression. Obesity. Plan:   Cont lexapro  Weight loss strategies d/w pt        Attending Physician Statement  I have discussed the case, including pertinent history and exam findings with the resident. I agree with the documented assessment and plan.

## 2022-11-28 NOTE — PROGRESS NOTES
[facility]  FAMILY MEDICINE RESIDENCY PROGRAM  DATE OF VISIT : 2022    Patient : Hal Garcia   Age : 27 y.o.    : 1995   MRN : [id number]   ______________________________________________________________________    Chief Complaint :   Chief Complaint   Patient presents with    Hypertension     Follow up and speak with therapist. Pt has no complaints today. HPI : Hal Garcia is 27 y.o. female who presented to the clinic today for follow-up of depression, co-treatment with Dr. Mario Alberto House. Depression: restarted lexapro 5 mg. Improved mood, energy, motivation, appetite. Denies SI/HI. Feels \"run down. 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 Typically works 5-6 days a week. Lives with mother and stepfather. Has 1 cat. Saving up money for cruise in May. Sleep improved. Sleeps 7 hours. Sometimes has difficulty falling asleep. Sleeps with phone and reads book in bed. Feels rested    BP improved on amlodipine 2.5 mg. Feels upset that she gained back her weight since last visit. States that she was attempting to make more Asian dishes with more vegetables, but that became more time consuming. She has tried joining a gym in the past with her mother, but was not motivated to continue. She is aware that she would have more energy if she were to lose weight. She has tried dieting in the past. Has difficulty with portion control. She is interested in talking with dietician.      Past Medical History :  Past Medical History:   Diagnosis Date    Obesity     PCOS (polycystic ovarian syndrome)      Past Surgical History:   Procedure Laterality Date    ARM SURGERY      CARPAL TUNNEL RELEASE      TONSILLECTOMY      WISDOM TOOTH EXTRACTION         Allergies :   No Known Allergies    Medication List :    Current Outpatient Medications   Medication Sig Dispense Refill    amLODIPine (NORVASC) 2.5 MG tablet TAKE 1 TABLET BY MOUTH EVERY DAY 30 tablet 4    BLISOVI 24 FE 1-20 MG-MCG(24) TABS TAKE 1 TABLET BY MOUTH EVERY DAY 28 tablet 0    escitalopram (LEXAPRO) 5 MG tablet Take 1 tablet by mouth daily 30 tablet 4     No current facility-administered medications for this visit. Review of Systems :  Review of Systems   Constitutional:  Negative for chills and fever. Respiratory:  Negative for cough and shortness of breath. Cardiovascular:  Negative for chest pain, palpitations and leg swelling. Gastrointestinal:  Negative for abdominal pain, constipation, diarrhea, nausea and vomiting. Genitourinary:  Negative for dysuria. Neurological:  Negative for dizziness, weakness, light-headedness and headaches.   ______________________________________________________________________    Physical Exam :    Vitals: /80 (Site: Left Upper Arm)   Pulse 82   Temp 97.7 °F (36.5 °C)   Resp 16   Ht 5' 2\" (1.575 m)   Wt (!) 301 lb 11.2 oz (136.9 kg)   SpO2 99%   BMI 55.18 kg/m²   General Appearance: Well developed, awake, alert, oriented, and in NAD  HEENT: NCAT, MMM, no pallor or icterus. Neck: Symmetrical, trachea midline. Chest wall/Lung:respirations unlabored. Heart: regular rate   Neurologic: Alert&Oriented x3. No focal motor deficits detected. Psychiatric: Normal mood. Normal affect. Normal behavior. Good judgement and insight.  ______________________________________________________________________    Assessment & Plan :    1. Depression  - PHQ-9 6 today, prior 9/2021 was 11.  - Continue lexapro 5 mg  - Discussed sleep hygiene   - Discussed goal setting     2. Hypertension   - Better controlled   - Continue Norvasc 2.5 mg daily    3. Class 3 severe obesity due to excess calories without serious comorbidity with BMI 50.0-59.9  - Discussed dietary changes. - Referral to dietician given. - Consider bariatric referral in the future.          Return to Office: Return in about 2 months (around 1/28/2023) for HTN, depression.     Candice Nichols MD  Case discussed with Dr. Era Almonte

## 2022-11-28 NOTE — PROGRESS NOTES
Pt seen with Dr. Rossana Ritter during office visit to address depression. See attached note regarding symptoms, PHQ9 score and progress on Lexapro. Provided education on sleep hygiene (reduce time spent in bed while not sleeping; no screens in bed; go to bed only when very tired). She works 2 jobs. Recently got significant raise. Saving for a cruise next year. Discussed all as having a positive impact on her mood. Motivational interviewing for weight loss. Biggest problems caused by weight is not being able to find clothes in her size. Has struggled for many years. PCP referring to dietician. Discussed what has worked in the past (cooking her own meals vs fast food) and how to implement SMART goals.

## 2022-12-12 DIAGNOSIS — N92.1 MENORRHAGIA WITH IRREGULAR CYCLE: ICD-10-CM

## 2022-12-12 RX ORDER — NORETHINDRONE ACETATE AND ETHINYL ESTRADIOL 1MG-20(24)
KIT ORAL
Qty: 28 TABLET | Refills: 3 | Status: SHIPPED | OUTPATIENT
Start: 2022-12-12

## 2022-12-12 NOTE — TELEPHONE ENCOUNTER
Last Appointment:  11/28/2022  Future Appointments  1/26/2023  1:40 PM    MD Maggie Larkin        [facility]

## 2023-01-22 DIAGNOSIS — F33.0 MILD EPISODE OF RECURRENT MAJOR DEPRESSIVE DISORDER (HCC): ICD-10-CM

## 2023-01-23 RX ORDER — ESCITALOPRAM OXALATE 5 MG/1
TABLET ORAL
Qty: 90 TABLET | Refills: 1 | Status: SHIPPED | OUTPATIENT
Start: 2023-01-23

## 2023-01-23 NOTE — TELEPHONE ENCOUNTER
Last Appointment:  11/28/2022  Future Appointments  1/26/2023  1:40 PM    MD Nikhil Mccullough [facility]

## 2023-02-17 DIAGNOSIS — I10 PRIMARY HYPERTENSION: ICD-10-CM

## 2023-02-17 RX ORDER — AMLODIPINE BESYLATE 2.5 MG/1
TABLET ORAL
Qty: 30 TABLET | Refills: 0 | Status: SHIPPED
Start: 2023-02-17 | End: 2023-03-17

## 2023-03-06 ENCOUNTER — OFFICE VISIT (OUTPATIENT)
Dept: FAMILY MEDICINE CLINIC | Age: 28
End: 2023-03-06
Payer: MEDICARE

## 2023-03-06 VITALS
BODY MASS INDEX: 53.92 KG/M2 | DIASTOLIC BLOOD PRESSURE: 88 MMHG | SYSTOLIC BLOOD PRESSURE: 136 MMHG | WEIGHT: 293 LBS | HEIGHT: 62 IN | OXYGEN SATURATION: 95 % | TEMPERATURE: 97.4 F | HEART RATE: 69 BPM

## 2023-03-06 DIAGNOSIS — E66.01 CLASS 3 SEVERE OBESITY DUE TO EXCESS CALORIES WITHOUT SERIOUS COMORBIDITY WITH BODY MASS INDEX (BMI) OF 50.0 TO 59.9 IN ADULT (HCC): ICD-10-CM

## 2023-03-06 DIAGNOSIS — L23.9 ALLERGIC DERMATITIS: ICD-10-CM

## 2023-03-06 DIAGNOSIS — R73.9 HYPERGLYCEMIA: Primary | ICD-10-CM

## 2023-03-06 DIAGNOSIS — R73.9 HYPERGLYCEMIA: ICD-10-CM

## 2023-03-06 LAB
ANION GAP SERPL CALCULATED.3IONS-SCNC: 11 MMOL/L (ref 7–16)
BUN BLDV-MCNC: 10 MG/DL (ref 6–20)
CALCIUM SERPL-MCNC: 9.4 MG/DL (ref 8.6–10.2)
CHLORIDE BLD-SCNC: 104 MMOL/L (ref 98–107)
CO2: 22 MMOL/L (ref 22–29)
CREAT SERPL-MCNC: 0.5 MG/DL (ref 0.5–1)
GFR SERPL CREATININE-BSD FRML MDRD: >60 ML/MIN/1.73
GLUCOSE BLD-MCNC: 86 MG/DL (ref 74–99)
HBA1C MFR BLD: 5.4 %
POTASSIUM SERPL-SCNC: 4.6 MMOL/L (ref 3.5–5)
SODIUM BLD-SCNC: 137 MMOL/L (ref 132–146)

## 2023-03-06 PROCEDURE — 36415 COLL VENOUS BLD VENIPUNCTURE: CPT | Performed by: FAMILY MEDICINE

## 2023-03-06 SDOH — ECONOMIC STABILITY: HOUSING INSECURITY
IN THE LAST 12 MONTHS, WAS THERE A TIME WHEN YOU DID NOT HAVE A STEADY PLACE TO SLEEP OR SLEPT IN A SHELTER (INCLUDING NOW)?: NO

## 2023-03-06 SDOH — ECONOMIC STABILITY: FOOD INSECURITY: WITHIN THE PAST 12 MONTHS, YOU WORRIED THAT YOUR FOOD WOULD RUN OUT BEFORE YOU GOT MONEY TO BUY MORE.: NEVER TRUE

## 2023-03-06 SDOH — ECONOMIC STABILITY: FOOD INSECURITY: WITHIN THE PAST 12 MONTHS, THE FOOD YOU BOUGHT JUST DIDN'T LAST AND YOU DIDN'T HAVE MONEY TO GET MORE.: NEVER TRUE

## 2023-03-06 SDOH — ECONOMIC STABILITY: INCOME INSECURITY: HOW HARD IS IT FOR YOU TO PAY FOR THE VERY BASICS LIKE FOOD, HOUSING, MEDICAL CARE, AND HEATING?: SOMEWHAT HARD

## 2023-03-06 ASSESSMENT — PATIENT HEALTH QUESTIONNAIRE - PHQ9
SUM OF ALL RESPONSES TO PHQ9 QUESTIONS 1 & 2: 2
7. TROUBLE CONCENTRATING ON THINGS, SUCH AS READING THE NEWSPAPER OR WATCHING TELEVISION: 0
SUM OF ALL RESPONSES TO PHQ QUESTIONS 1-9: 2
4. FEELING TIRED OR HAVING LITTLE ENERGY: 0
6. FEELING BAD ABOUT YOURSELF - OR THAT YOU ARE A FAILURE OR HAVE LET YOURSELF OR YOUR FAMILY DOWN: 0
3. TROUBLE FALLING OR STAYING ASLEEP: 0
SUM OF ALL RESPONSES TO PHQ QUESTIONS 1-9: 2
SUM OF ALL RESPONSES TO PHQ QUESTIONS 1-9: 2
9. THOUGHTS THAT YOU WOULD BE BETTER OFF DEAD, OR OF HURTING YOURSELF: 0
5. POOR APPETITE OR OVEREATING: 0
1. LITTLE INTEREST OR PLEASURE IN DOING THINGS: 1
SUM OF ALL RESPONSES TO PHQ QUESTIONS 1-9: 2
2. FEELING DOWN, DEPRESSED OR HOPELESS: 1
8. MOVING OR SPEAKING SO SLOWLY THAT OTHER PEOPLE COULD HAVE NOTICED. OR THE OPPOSITE, BEING SO FIGETY OR RESTLESS THAT YOU HAVE BEEN MOVING AROUND A LOT MORE THAN USUAL: 0
10. IF YOU CHECKED OFF ANY PROBLEMS, HOW DIFFICULT HAVE THESE PROBLEMS MADE IT FOR YOU TO DO YOUR WORK, TAKE CARE OF THINGS AT HOME, OR GET ALONG WITH OTHER PEOPLE: 0

## 2023-03-06 NOTE — PROGRESS NOTES
[facility] RESIDENCY PROGRAM  DATE OF VISIT : 3/6/2023    Patient : Joe Goldman   Age : 32 y.o.  : 1995   MRN : [id number]   ______________________________________________________________________    Chief Complaint :   Chief Complaint   Patient presents with    Hypertension    Discuss Medications    Allergic Reaction     Allergic reaction to shampoo       HPI : Joe Goldman is 32 y.o. female who presented to the clinic today for depression, rash and HTN. Depression- PHQ-9 2. Felt more down after missing 1-2 days of lexapro. Passive SI, but no plan. Symptoms improved after restarting. Rash- bottom of hair line at base of neck. Started after starting a new shampoo. Itching to the point of bleeding sometimes. Improving after stopping shampoo    HTN- controlled on amlodipine 2.5 mg daily. Patient's medications, allergies, past medical, surgical, social and family histories were reviewed and updated as appropriate. Past Medical History :  Past Medical History:   Diagnosis Date    Obesity     PCOS (polycystic ovarian syndrome)      Past Surgical History:   Procedure Laterality Date    ARM SURGERY      CARPAL TUNNEL RELEASE      TONSILLECTOMY      WISDOM TOOTH EXTRACTION         Allergies :   No Known Allergies    Medication List :    Current Outpatient Medications   Medication Sig Dispense Refill    amLODIPine (NORVASC) 2.5 MG tablet TAKE 1 TABLET BY MOUTH EVERY DAY 30 tablet 0    escitalopram (LEXAPRO) 5 MG tablet TAKE 1 TABLET BY MOUTH EVERY DAY 90 tablet 1    BLISOVI 24 FE 1-20 MG-MCG(24) TABS TAKE 1 TABLET BY MOUTH EVERY DAY 28 tablet 3     No current facility-administered medications for this visit.         Review of Systems :  Review of Systems  ______________________________________________________________________    Physical Exam :    Vitals: /88   Pulse 69   Temp 97.4 °F (36.3 °C) (Temporal)   Ht 5' 2\" (1.575 m)   Wt (!) 303 lb (137.4 kg)   SpO2 95%   BMI 55.42 kg/m²   General Appearance: Well developed, awake, alert, oriented, and in NAD  HEENT: NCAT, MMM, no pallor or icterus. Neck: Symmetrical, trachea midline. Chest wall/Lung: CTAB, respirations unlabored. No ronchi/wheezing/rales   Heart: RRR, normal S1 and S2, no murmurs, rubs or gallops. Abdomen: SNTND  Extremities: Extremities normal, atraumatic, no cyanosis, clubbing or  edema. Skin: acanthosis nigricans present on back of neck. Several discoid lesions of varying sizes also present in that area. Possible central clearing? Vs raised plaque. Nontender, nonpruiritic, nondraining. No flaking or surrounding erythema. Musculokeletal: ROM grossly normal in all joints of extremities, no obvious joint swelling. Lymphnodes: No lymph node enlargement appreciated  Neurologic: Alert&Oriented x3. No focal motor deficits detected. Psychiatric: Normal mood. Normal affect. Normal behavior. ______________________________________________________________________    Assessment & Plan :    1. Hyperglycemia  2. Obesity   - POCT glycosylated hemoglobin (Hb A1C) 5.4  in office today   - Initial concern for DM due to acanthosis nigricans.   - Ongoing discussion pertaining to weight loss. She has been attempting portion control and increasing walking and housework. Discussed talk test   - Will also check BMP   - Basic Metabolic Panel; Future    3. Allergic dermatitis  - lesion noted on back of neck could be allergic dermatitis vs vs tinea vs early psoriasis vs eczema   - Will treat with hydrocortisone cream   - Discontinue offending agent   - hydrocortisone 2.5 % cream; Apply topically 2 times daily. Dispense: 20 g; Refill: 0        Return to Office: Return in about 4 weeks (around 4/3/2023) for HTN,rash .     Vincent Yañez MD  Case discussed with Dr. Eloisa Quevedo

## 2023-03-06 NOTE — PROGRESS NOTES
S: 32 y.o. female presents today for Hypertension, Discuss Medications, and Allergic Reaction (Allergic reaction to shampoo)    HTN: improved on amlodipine 2.5mg; no new se    Depression: missed coupled days of lexapro; forgot; improved back on this; fleeting SI days she missed which has now resolved; feels controlled  No data recorded    Rash: noticed after new shampoo; base of neck; very pruritic; improved after stopping shampoo    O: VS: /88   Pulse 69   Temp 97.4 °F (36.3 °C) (Temporal)   Ht 5' 2\" (1.575 m)   Wt (!) 303 lb (137.4 kg)   SpO2 95%   BMI 55.42 kg/m²   AAO/NAD, appropriate affect for mood  CV:  RRR, no murmur  Resp: CTAB  Abdomen: SNTND  Ext: no edema  Skin: annular lesions base of neck about 0.5-1cm diameter, raised; see media; neck with hyperpigmented and hypertrophic appearance    Assessment/Plan:   1) HTN - stable, cpm  2) Depression - continue lexapro 5mg; declines increase today; declines counseling at this time; verbal contract; 988; adherence; f/u PCP  3) acanthosis nigricans - repeat A1C; labs as ordered  4) annular dermatitis  / allergic derm - steroid topical for now; hydrocortisone 2.5%  RTO: 1 month f/u      Attending Physician Statement  I have discussed the case, including pertinent history and exam findings with the resident. I agree with the documented assessment and plan.       Electronically signed by Darrick Alcala MD on 3/13/2023 at 9:53 AM

## 2023-03-07 DIAGNOSIS — N92.1 MENORRHAGIA WITH IRREGULAR CYCLE: ICD-10-CM

## 2023-03-07 RX ORDER — NORETHINDRONE ACETATE AND ETHINYL ESTRADIOL 1MG-20(24)
KIT ORAL
Qty: 84 TABLET | Refills: 3 | Status: SHIPPED | OUTPATIENT
Start: 2023-03-07

## 2023-03-16 DIAGNOSIS — I10 PRIMARY HYPERTENSION: ICD-10-CM

## 2023-03-17 RX ORDER — AMLODIPINE BESYLATE 2.5 MG/1
TABLET ORAL
Qty: 30 TABLET | Refills: 2 | Status: SHIPPED | OUTPATIENT
Start: 2023-03-17

## 2023-07-28 DIAGNOSIS — F33.0 MILD EPISODE OF RECURRENT MAJOR DEPRESSIVE DISORDER (HCC): ICD-10-CM

## 2023-07-28 RX ORDER — ESCITALOPRAM OXALATE 5 MG/1
TABLET ORAL
Qty: 90 TABLET | Refills: 0 | Status: SHIPPED | OUTPATIENT
Start: 2023-07-28

## 2023-08-29 DIAGNOSIS — I10 PRIMARY HYPERTENSION: ICD-10-CM

## 2023-08-29 RX ORDER — AMLODIPINE BESYLATE 2.5 MG/1
2.5 TABLET ORAL DAILY
Qty: 30 TABLET | Refills: 2 | Status: SHIPPED | OUTPATIENT
Start: 2023-08-29

## 2023-11-07 DIAGNOSIS — F33.0 MILD EPISODE OF RECURRENT MAJOR DEPRESSIVE DISORDER (HCC): ICD-10-CM

## 2023-11-10 RX ORDER — ESCITALOPRAM OXALATE 5 MG/1
TABLET ORAL
Qty: 30 TABLET | Refills: 0 | Status: SHIPPED | OUTPATIENT
Start: 2023-11-10

## 2023-11-10 NOTE — TELEPHONE ENCOUNTER
Pt will schedule her appt on my chart, she just got a new job and don't have her schedule with her this moment.

## 2023-11-29 DIAGNOSIS — I10 PRIMARY HYPERTENSION: ICD-10-CM

## 2023-11-30 RX ORDER — AMLODIPINE BESYLATE 2.5 MG/1
2.5 TABLET ORAL DAILY
Qty: 30 TABLET | Refills: 0 | Status: SHIPPED | OUTPATIENT
Start: 2023-11-30

## 2023-11-30 NOTE — TELEPHONE ENCOUNTER
Phoned pt, she said now was not a good time to schedule that appt and that she will call back to schedule. Detail Level: Simple Detail Level: Detailed

## 2024-02-12 ASSESSMENT — PATIENT HEALTH QUESTIONNAIRE - PHQ9
SUM OF ALL RESPONSES TO PHQ9 QUESTIONS 1 & 2: 0
4. FEELING TIRED OR HAVING LITTLE ENERGY: 2
SUM OF ALL RESPONSES TO PHQ QUESTIONS 1-9: 10
9. THOUGHTS THAT YOU WOULD BE BETTER OFF DEAD, OR OF HURTING YOURSELF: 0
1. LITTLE INTEREST OR PLEASURE IN DOING THINGS: 0
10. IF YOU CHECKED OFF ANY PROBLEMS, HOW DIFFICULT HAVE THESE PROBLEMS MADE IT FOR YOU TO DO YOUR WORK, TAKE CARE OF THINGS AT HOME, OR GET ALONG WITH OTHER PEOPLE: 1
5. POOR APPETITE OR OVEREATING: 3
SUM OF ALL RESPONSES TO PHQ QUESTIONS 1-9: 10
6. FEELING BAD ABOUT YOURSELF - OR THAT YOU ARE A FAILURE OR HAVE LET YOURSELF OR YOUR FAMILY DOWN: 0
3. TROUBLE FALLING OR STAYING ASLEEP: 1
7. TROUBLE CONCENTRATING ON THINGS, SUCH AS READING THE NEWSPAPER OR WATCHING TELEVISION: 3
2. FEELING DOWN, DEPRESSED OR HOPELESS: 0
SUM OF ALL RESPONSES TO PHQ QUESTIONS 1-9: 10
SUM OF ALL RESPONSES TO PHQ QUESTIONS 1-9: 10
8. MOVING OR SPEAKING SO SLOWLY THAT OTHER PEOPLE COULD HAVE NOTICED. OR THE OPPOSITE, BEING SO FIGETY OR RESTLESS THAT YOU HAVE BEEN MOVING AROUND A LOT MORE THAN USUAL: 1

## 2024-02-14 ASSESSMENT — PATIENT HEALTH QUESTIONNAIRE - PHQ9
9. THOUGHTS THAT YOU WOULD BE BETTER OFF DEAD, OR OF HURTING YOURSELF: NOT AT ALL
6. FEELING BAD ABOUT YOURSELF - OR THAT YOU ARE A FAILURE OR HAVE LET YOURSELF OR YOUR FAMILY DOWN: NOT AT ALL
1. LITTLE INTEREST OR PLEASURE IN DOING THINGS: NOT AT ALL
2. FEELING DOWN, DEPRESSED OR HOPELESS: NOT AT ALL
SUM OF ALL RESPONSES TO PHQ QUESTIONS 1-9: 10
4. FEELING TIRED OR HAVING LITTLE ENERGY: MORE THAN HALF THE DAYS
3. TROUBLE FALLING OR STAYING ASLEEP: SEVERAL DAYS
10. IF YOU CHECKED OFF ANY PROBLEMS, HOW DIFFICULT HAVE THESE PROBLEMS MADE IT FOR YOU TO DO YOUR WORK, TAKE CARE OF THINGS AT HOME, OR GET ALONG WITH OTHER PEOPLE: SOMEWHAT DIFFICULT
7. TROUBLE CONCENTRATING ON THINGS, SUCH AS READING THE NEWSPAPER OR WATCHING TELEVISION: NEARLY EVERY DAY
5. POOR APPETITE OR OVEREATING: NEARLY EVERY DAY
8. MOVING OR SPEAKING SO SLOWLY THAT OTHER PEOPLE COULD HAVE NOTICED. OR THE OPPOSITE - BEING SO FIDGETY OR RESTLESS THAT YOU HAVE BEEN MOVING AROUND A LOT MORE THAN USUAL: SEVERAL DAYS

## 2024-02-20 ENCOUNTER — OFFICE VISIT (OUTPATIENT)
Dept: FAMILY MEDICINE CLINIC | Age: 29
End: 2024-02-20
Payer: COMMERCIAL

## 2024-02-20 VITALS
BODY MASS INDEX: 53.92 KG/M2 | OXYGEN SATURATION: 98 % | HEIGHT: 62 IN | HEART RATE: 76 BPM | RESPIRATION RATE: 16 BRPM | TEMPERATURE: 97.6 F | SYSTOLIC BLOOD PRESSURE: 129 MMHG | DIASTOLIC BLOOD PRESSURE: 87 MMHG | WEIGHT: 293 LBS

## 2024-02-20 DIAGNOSIS — I10 PRIMARY HYPERTENSION: ICD-10-CM

## 2024-02-20 DIAGNOSIS — E28.2 PCOS (POLYCYSTIC OVARIAN SYNDROME): Primary | ICD-10-CM

## 2024-02-20 DIAGNOSIS — F33.0 MILD EPISODE OF RECURRENT MAJOR DEPRESSIVE DISORDER (HCC): ICD-10-CM

## 2024-02-20 DIAGNOSIS — N92.1 MENORRHAGIA WITH IRREGULAR CYCLE: ICD-10-CM

## 2024-02-20 LAB
ALBUMIN SERPL-MCNC: 4.4 G/DL (ref 3.5–5.2)
ALP BLD-CCNC: 69 U/L (ref 35–104)
ALT SERPL-CCNC: 19 U/L (ref 0–32)
ANION GAP SERPL CALCULATED.3IONS-SCNC: 15 MMOL/L (ref 7–16)
AST SERPL-CCNC: 21 U/L (ref 0–31)
BILIRUB SERPL-MCNC: 0.3 MG/DL (ref 0–1.2)
BUN BLDV-MCNC: 16 MG/DL (ref 6–20)
CALCIUM SERPL-MCNC: 9.3 MG/DL (ref 8.6–10.2)
CHLORIDE BLD-SCNC: 101 MMOL/L (ref 98–107)
CO2: 22 MMOL/L (ref 22–29)
CREAT SERPL-MCNC: 0.5 MG/DL (ref 0.5–1)
GFR SERPL CREATININE-BSD FRML MDRD: >60 ML/MIN/1.73M2
GLUCOSE BLD-MCNC: 80 MG/DL (ref 74–99)
PLATELET CONFIRMATION: NORMAL
POTASSIUM SERPL-SCNC: 4.8 MMOL/L (ref 3.5–5)
SODIUM BLD-SCNC: 138 MMOL/L (ref 132–146)
TOTAL PROTEIN: 7.8 G/DL (ref 6.4–8.3)

## 2024-02-20 PROCEDURE — 99213 OFFICE O/P EST LOW 20 MIN: CPT

## 2024-02-20 PROCEDURE — 36415 COLL VENOUS BLD VENIPUNCTURE: CPT | Performed by: FAMILY MEDICINE

## 2024-02-20 PROCEDURE — 3079F DIAST BP 80-89 MM HG: CPT

## 2024-02-20 PROCEDURE — 3074F SYST BP LT 130 MM HG: CPT

## 2024-02-20 RX ORDER — AMLODIPINE BESYLATE 2.5 MG/1
2.5 TABLET ORAL DAILY
Qty: 30 TABLET | Refills: 0 | Status: SHIPPED | OUTPATIENT
Start: 2024-02-20

## 2024-02-20 RX ORDER — ESCITALOPRAM OXALATE 5 MG/1
5 TABLET ORAL DAILY
Qty: 30 TABLET | Refills: 0 | Status: SHIPPED | OUTPATIENT
Start: 2024-02-20

## 2024-02-20 RX ORDER — NORETHINDRONE ACETATE AND ETHINYL ESTRADIOL 1MG-20(24)
1 KIT ORAL DAILY
Qty: 84 TABLET | Refills: 3 | Status: SHIPPED | OUTPATIENT
Start: 2024-02-20

## 2024-02-20 NOTE — PROGRESS NOTES
[facility]  FAMILY MEDICINE RESIDENCY PROGRAM  DATE OF VISIT : 2024    Patient : Joselyn Mccormack   Age : 28 y.o.    : 1995   MRN : [id number]   ______________________________________________________________________    Chief Complaint:   Chief Complaint   Patient presents with    Follow-up       HPI:   History obtained from the patient. Joselyn Mccormack is a 28 y.o. female with PMHx of HTN, PCOS and depression who presents to the clinic for follow up and medication refill.    Patient states that she has been out of all her medications since losing insurance coverage a couple of months ago. She now has coverage through her new job.     Patient reports mood is overall stable and is having more good days than bad days. Bad days are characterized by anhedonia, and a desire to self-isolate. She endorses having had thoughts of not wanting to be in the world anymore but has never had SI/HI nor plans to execute. She will like to stop taking lexapro at this time. She will like to establish care with a counselor at this time.     Patient endorses intermittent Rt lower quadrant tenderness /stabby pain radiating down to groin that started 2 months ago. Similar episodes occurred in  when she was diagnosed with PCOS. She had a transvaginal US at that time that confirmed multiple follicles in the Rt ovary consistent with PCOS.  She states that LMP was 3 weeks ago. Periods have been irregular, lasting > 7days, non painful, not heavy flow. She denies any urinary symptoms such as dysuria, frequency, urgency, polyuria.  Patient had been taking Blisovi prior to losing insurance coverage. This medication was started by OB/Gyn. She has not followed up with OB since . She is inquiring about stopping the OCP at this time.    BP in clinic is well controlled. She does not measure BP at home. She endorses episodes of palpitations at rest in the past. Denies CP, SOB, changes in vision,

## 2024-02-20 NOTE — PROGRESS NOTES
Attending Physician Statement    S:   Chief Complaint   Patient presents with    Follow-up      28/F who presents for follow up of hypertension, depression and PCOS.  Patient recently uninsured and without medications but has since restarted.  HTN - Stable. Compliant with medications, no reported side effects. Patient denies headaches, changes in vision, syncope, chest pain, SOB, palpitations, diaphoresis and leg swelling.   Depression - No SI/HI even off of Lexapro 5mg.  Interested in establishing with counseling and discontinuing SSRIs.   PCOS - on OCPs with mild improvement of hirsutism features.  Patient has been inconsistent with OCP given insurance situation.  O: Blood pressure 129/87, pulse 76, temperature 97.6 °F (36.4 °C), temperature source Temporal, resp. rate 16, height 1.575 m (5' 2\"), weight (!) 142.9 kg (315 lb), last menstrual period 01/27/2024, SpO2 98 %, not currently breastfeeding.   Exam:   Heart - RRR   Lungs - clear  A/P: PCOS - continue OCP, referral to GYN, consider addition of spironolactone  Depression - referral to Marcel consider weaning off SSRI    HTN - continue 2.5mg amlodipine, consider discontinuing amlodipine if adding spironolactone for PCOS   Follow-up as ordered    I have discussed the case, including pertinent history and exam findings with the resident. I agree with the documented assessment and plan.       Obed Brunner MD

## 2024-02-21 LAB
ABSOLUTE IMMATURE GRANULOCYTE: 0.04 K/UL (ref 0–0.58)
BASOPHILS ABSOLUTE: 0.05 K/UL (ref 0–0.2)
BASOPHILS RELATIVE PERCENT: 1 % (ref 0–2)
EOSINOPHILS ABSOLUTE: 0.31 K/UL (ref 0.05–0.5)
EOSINOPHILS RELATIVE PERCENT: 3 % (ref 0–6)
HBA1C MFR BLD: 5.2 % (ref 4–5.6)
HCT VFR BLD CALC: 41.6 % (ref 34–48)
HEMOGLOBIN: 13.2 G/DL (ref 11.5–15.5)
IMMATURE GRANULOCYTES: 0 % (ref 0–5)
LYMPHOCYTES ABSOLUTE: 2.62 K/UL (ref 1.5–4)
LYMPHOCYTES RELATIVE PERCENT: 25 % (ref 20–42)
MCH RBC QN AUTO: 29.5 PG (ref 26–35)
MCHC RBC AUTO-ENTMCNC: 31.7 G/DL (ref 32–34.5)
MCV RBC AUTO: 93.1 FL (ref 80–99.9)
MONOCYTES ABSOLUTE: 0.61 K/UL (ref 0.1–0.95)
MONOCYTES RELATIVE PERCENT: 6 % (ref 2–12)
NEUTROPHILS ABSOLUTE: 6.83 K/UL (ref 1.8–7.3)
NEUTROPHILS RELATIVE PERCENT: 65 % (ref 43–80)
PDW BLD-RTO: 12.4 % (ref 11.5–15)
PLATELET, FLUORESCENCE: 311 K/UL (ref 130–450)
PMV BLD AUTO: 11.7 FL (ref 7–12)
RBC # BLD: 4.47 M/UL (ref 3.5–5.5)
WBC # BLD: 10.5 K/UL (ref 4.5–11.5)

## 2024-02-21 ASSESSMENT — ENCOUNTER SYMPTOMS
RHINORRHEA: 0
COUGH: 0
CHEST TIGHTNESS: 0
SHORTNESS OF BREATH: 0
SORE THROAT: 0
ABDOMINAL DISTENTION: 0
NAUSEA: 0
VOMITING: 0
ABDOMINAL PAIN: 1
CONSTIPATION: 0
SINUS PAIN: 0
DIARRHEA: 0

## 2024-03-18 DIAGNOSIS — I10 PRIMARY HYPERTENSION: ICD-10-CM

## 2024-03-18 DIAGNOSIS — F33.0 MILD EPISODE OF RECURRENT MAJOR DEPRESSIVE DISORDER (HCC): ICD-10-CM

## 2024-03-18 RX ORDER — ESCITALOPRAM OXALATE 5 MG/1
5 TABLET ORAL DAILY
Qty: 30 TABLET | Refills: 2 | Status: SHIPPED | OUTPATIENT
Start: 2024-03-18

## 2024-03-20 RX ORDER — AMLODIPINE BESYLATE 2.5 MG/1
2.5 TABLET ORAL DAILY
Qty: 30 TABLET | Refills: 2 | Status: SHIPPED | OUTPATIENT
Start: 2024-03-20

## 2024-06-21 DIAGNOSIS — F33.0 MILD EPISODE OF RECURRENT MAJOR DEPRESSIVE DISORDER (HCC): ICD-10-CM

## 2024-06-21 DIAGNOSIS — I10 PRIMARY HYPERTENSION: ICD-10-CM

## 2024-06-21 RX ORDER — AMLODIPINE BESYLATE 2.5 MG/1
2.5 TABLET ORAL DAILY
Qty: 30 TABLET | Refills: 2 | Status: SHIPPED | OUTPATIENT
Start: 2024-06-21

## 2024-06-21 RX ORDER — ESCITALOPRAM OXALATE 5 MG/1
5 TABLET ORAL DAILY
Qty: 30 TABLET | Refills: 2 | Status: SHIPPED | OUTPATIENT
Start: 2024-06-21

## 2024-06-21 NOTE — TELEPHONE ENCOUNTER
Last Appointment:  4/11/2023  Future Appointments   Date Time Provider Department Center   7/15/2024 10:00 AM Gaston Wong MD Fam [facility]

## 2024-07-14 SDOH — HEALTH STABILITY: PHYSICAL HEALTH: ON AVERAGE, HOW MANY MINUTES DO YOU ENGAGE IN EXERCISE AT THIS LEVEL?: 10 MIN

## 2024-07-14 SDOH — HEALTH STABILITY: PHYSICAL HEALTH: ON AVERAGE, HOW MANY DAYS PER WEEK DO YOU ENGAGE IN MODERATE TO STRENUOUS EXERCISE (LIKE A BRISK WALK)?: 1 DAY

## 2024-07-15 ENCOUNTER — OFFICE VISIT (OUTPATIENT)
Dept: FAMILY MEDICINE CLINIC | Age: 29
End: 2024-07-15
Payer: COMMERCIAL

## 2024-07-15 VITALS
HEART RATE: 80 BPM | BODY MASS INDEX: 53.92 KG/M2 | WEIGHT: 293 LBS | HEIGHT: 62 IN | OXYGEN SATURATION: 97 % | RESPIRATION RATE: 18 BRPM | DIASTOLIC BLOOD PRESSURE: 82 MMHG | SYSTOLIC BLOOD PRESSURE: 138 MMHG | TEMPERATURE: 98.5 F

## 2024-07-15 DIAGNOSIS — E28.2 PCOS (POLYCYSTIC OVARIAN SYNDROME): ICD-10-CM

## 2024-07-15 DIAGNOSIS — F33.0 MILD EPISODE OF RECURRENT MAJOR DEPRESSIVE DISORDER (HCC): ICD-10-CM

## 2024-07-15 DIAGNOSIS — E66.01 CLASS 3 SEVERE OBESITY DUE TO EXCESS CALORIES WITHOUT SERIOUS COMORBIDITY WITH BODY MASS INDEX (BMI) OF 50.0 TO 59.9 IN ADULT (HCC): ICD-10-CM

## 2024-07-15 DIAGNOSIS — G47.33 OSA (OBSTRUCTIVE SLEEP APNEA): ICD-10-CM

## 2024-07-15 DIAGNOSIS — I10 PRIMARY HYPERTENSION: Primary | ICD-10-CM

## 2024-07-15 PROCEDURE — G2211 COMPLEX E/M VISIT ADD ON: HCPCS

## 2024-07-15 PROCEDURE — 99213 OFFICE O/P EST LOW 20 MIN: CPT

## 2024-07-15 PROCEDURE — 3075F SYST BP GE 130 - 139MM HG: CPT

## 2024-07-15 PROCEDURE — 3079F DIAST BP 80-89 MM HG: CPT

## 2024-07-15 SDOH — ECONOMIC STABILITY: FOOD INSECURITY: WITHIN THE PAST 12 MONTHS, THE FOOD YOU BOUGHT JUST DIDN'T LAST AND YOU DIDN'T HAVE MONEY TO GET MORE.: NEVER TRUE

## 2024-07-15 SDOH — ECONOMIC STABILITY: FOOD INSECURITY: WITHIN THE PAST 12 MONTHS, YOU WORRIED THAT YOUR FOOD WOULD RUN OUT BEFORE YOU GOT MONEY TO BUY MORE.: NEVER TRUE

## 2024-07-15 SDOH — ECONOMIC STABILITY: INCOME INSECURITY: HOW HARD IS IT FOR YOU TO PAY FOR THE VERY BASICS LIKE FOOD, HOUSING, MEDICAL CARE, AND HEATING?: NOT HARD AT ALL

## 2024-07-15 NOTE — PROGRESS NOTES
S: 28 y.o. female here for transfer of care and HTN. Controlled.   Anxiety and depression controlled on lexapro.   Obesity. Not controlled.     O: VS: /82 (Site: Left Lower Arm, Position: Sitting, Cuff Size: Medium Adult)   Pulse 80   Temp 98.5 °F (36.9 °C) (Temporal)   Resp 18   Ht 1.575 m (5' 2\")   Wt (!) 146.5 kg (323 lb)   LMP 05/17/2024   SpO2 97%   BMI 59.08 kg/m²    General: NAD, alert and interacting appropriately.    CV:  RRR, no gallops, rubs, or murmurs    Resp: CTAB   Abd:  Soft, nontender   Ext:  No edema    Impression: HTN, anxiety, depression, obesity.   Plan:   CPM HTN  CPM anxiety and depression  Refer Dr. August  Pap with GYN here    Attending Physician Statement  I have discussed the case, including pertinent history and exam findings with the resident.  I agree with the documented assessment and plan.      
tablet TAKE 1 TABLET BY MOUTH EVERY DAY 30 tablet 2    Norethin Ace-Eth Estrad-FE (BLISOVI 24 FE) 1-20 MG-MCG(24) TABS Take 1 tablet by mouth daily 84 tablet 3     No current facility-administered medications for this visit.        REVIEW OF SYSTEMS  All negative unless specified in the HPI.     PHYSICAL EXAM  /82 (Site: Left Lower Arm, Position: Sitting, Cuff Size: Medium Adult)   Pulse 80   Temp 98.5 °F (36.9 °C) (Temporal)   Resp 18   Ht 1.575 m (5' 2\")   Wt (!) 146.5 kg (323 lb)   LMP 05/17/2024   SpO2 97%   BMI 59.08 kg/m²    Physical Exam  Vitals and nursing note reviewed.   Constitutional:       Appearance: Normal appearance. She is obese.   HENT:      Head: Normocephalic.      Right Ear: External ear normal.      Left Ear: External ear normal.      Nose: Nose normal.      Mouth/Throat:      Mouth: Mucous membranes are moist.      Pharynx: Oropharynx is clear.   Eyes:      Conjunctiva/sclera: Conjunctivae normal.      Pupils: Pupils are equal, round, and reactive to light.   Cardiovascular:      Rate and Rhythm: Normal rate and regular rhythm.      Pulses: Normal pulses.      Heart sounds: Normal heart sounds.   Pulmonary:      Effort: Pulmonary effort is normal.      Breath sounds: Normal breath sounds.   Abdominal:      General: Abdomen is flat. Bowel sounds are normal.      Palpations: Abdomen is soft.      Tenderness: There is no abdominal tenderness.   Musculoskeletal:      Cervical back: Neck supple.      Right lower leg: No edema.      Left lower leg: No edema.   Skin:     General: Skin is warm.   Neurological:      General: No focal deficit present.      Mental Status: She is alert and oriented to person, place, and time. Mental status is at baseline.   Psychiatric:         Mood and Affect: Mood normal.         Behavior: Behavior normal.         Thought Content: Thought content normal.         Judgment: Judgment normal.

## 2024-08-13 ENCOUNTER — TELEPHONE (OUTPATIENT)
Dept: SLEEP MEDICINE | Age: 29
End: 2024-08-13

## 2024-08-13 NOTE — TELEPHONE ENCOUNTER
Received message from pt who was returning call (from preservice) to schedule appt.  Rt pts call and let her know the appt is for sleep Dr not actual sleep study.  Asked her to call to schedule

## 2024-09-15 DIAGNOSIS — I10 PRIMARY HYPERTENSION: ICD-10-CM

## 2024-09-15 DIAGNOSIS — F33.0 MILD EPISODE OF RECURRENT MAJOR DEPRESSIVE DISORDER (HCC): ICD-10-CM

## 2024-09-16 RX ORDER — ESCITALOPRAM OXALATE 5 MG/1
5 TABLET ORAL DAILY
Qty: 30 TABLET | Refills: 2 | Status: SHIPPED | OUTPATIENT
Start: 2024-09-16

## 2024-09-16 RX ORDER — AMLODIPINE BESYLATE 2.5 MG/1
2.5 TABLET ORAL DAILY
Qty: 30 TABLET | Refills: 2 | Status: SHIPPED | OUTPATIENT
Start: 2024-09-16

## 2024-09-23 ENCOUNTER — OFFICE VISIT (OUTPATIENT)
Dept: BARIATRICS/WEIGHT MGMT | Age: 29
End: 2024-09-23
Payer: COMMERCIAL

## 2024-09-23 VITALS
HEART RATE: 91 BPM | SYSTOLIC BLOOD PRESSURE: 126 MMHG | BODY MASS INDEX: 53.92 KG/M2 | HEIGHT: 62 IN | TEMPERATURE: 97.8 F | WEIGHT: 293 LBS | DIASTOLIC BLOOD PRESSURE: 76 MMHG

## 2024-09-23 DIAGNOSIS — E66.01 CLASS 3 SEVERE OBESITY DUE TO EXCESS CALORIES WITH SERIOUS COMORBIDITY AND BODY MASS INDEX (BMI) OF 50.0 TO 59.9 IN ADULT: ICD-10-CM

## 2024-09-23 DIAGNOSIS — I10 PRIMARY HYPERTENSION: Primary | ICD-10-CM

## 2024-09-23 PROCEDURE — 99202 OFFICE O/P NEW SF 15 MIN: CPT

## 2024-09-23 PROCEDURE — 99205 OFFICE O/P NEW HI 60 MIN: CPT | Performed by: CLINICAL NURSE SPECIALIST

## 2024-09-23 PROCEDURE — 3078F DIAST BP <80 MM HG: CPT | Performed by: CLINICAL NURSE SPECIALIST

## 2024-09-23 PROCEDURE — 3074F SYST BP LT 130 MM HG: CPT | Performed by: CLINICAL NURSE SPECIALIST

## 2024-09-23 RX ORDER — PHENTERMINE HYDROCHLORIDE 37.5 MG/1
TABLET ORAL
Qty: 30 TABLET | Refills: 0 | Status: SHIPPED | OUTPATIENT
Start: 2024-09-23 | End: 2024-10-23

## 2024-09-23 ASSESSMENT — ENCOUNTER SYMPTOMS
VOMITING: 0
COUGH: 0
CONSTIPATION: 0
NAUSEA: 0
SHORTNESS OF BREATH: 0
DIARRHEA: 0
BACK PAIN: 0

## 2024-10-25 ENCOUNTER — OFFICE VISIT (OUTPATIENT)
Dept: BARIATRICS/WEIGHT MGMT | Age: 29
End: 2024-10-25
Payer: COMMERCIAL

## 2024-10-25 VITALS
BODY MASS INDEX: 53.92 KG/M2 | DIASTOLIC BLOOD PRESSURE: 82 MMHG | HEART RATE: 87 BPM | SYSTOLIC BLOOD PRESSURE: 146 MMHG | HEIGHT: 62 IN | WEIGHT: 293 LBS

## 2024-10-25 DIAGNOSIS — E66.01 CLASS 3 SEVERE OBESITY DUE TO EXCESS CALORIES WITH SERIOUS COMORBIDITY AND BODY MASS INDEX (BMI) OF 50.0 TO 59.9 IN ADULT: ICD-10-CM

## 2024-10-25 DIAGNOSIS — E66.813 CLASS 3 SEVERE OBESITY DUE TO EXCESS CALORIES WITH SERIOUS COMORBIDITY AND BODY MASS INDEX (BMI) OF 50.0 TO 59.9 IN ADULT: ICD-10-CM

## 2024-10-25 DIAGNOSIS — I10 PRIMARY HYPERTENSION: Primary | ICD-10-CM

## 2024-10-25 PROCEDURE — 3077F SYST BP >= 140 MM HG: CPT | Performed by: CLINICAL NURSE SPECIALIST

## 2024-10-25 PROCEDURE — 3079F DIAST BP 80-89 MM HG: CPT | Performed by: CLINICAL NURSE SPECIALIST

## 2024-10-25 PROCEDURE — 99214 OFFICE O/P EST MOD 30 MIN: CPT | Performed by: CLINICAL NURSE SPECIALIST

## 2024-10-25 RX ORDER — PHENTERMINE HYDROCHLORIDE 37.5 MG/1
TABLET ORAL
Qty: 30 TABLET | Refills: 0 | Status: SHIPPED | OUTPATIENT
Start: 2024-10-25 | End: 2024-11-25

## 2024-10-25 ASSESSMENT — ENCOUNTER SYMPTOMS
NAUSEA: 0
VOMITING: 0
SHORTNESS OF BREATH: 0
CONSTIPATION: 0

## 2024-10-25 NOTE — PATIENT INSTRUCTIONS
Patient Instructions:    Take phentermine 37.5 mg, one-half to one tablet daily as needed for appetite suppression. Take each dose 30-90 min before effect will be needed.    While taking phentermine, check the Blood Pressure every morning and every evening.     If the systolic BP is >155 mmHg, the diastolic BP is >90 mm/Hg or the heart rate is > 100 beats per minute, do not take phentermine that day.    If the systolic BP is consistently >155 mmHg, the diastolic BP is consistently above 90 mm/Hg or the heart rate is consistently > 100 beats per minute, then stop taking phentermine altogether.    If the systolic BP >170 mmHg or the diastolic BP is >100 mmHg (even if it is only once), then phentermine should be stopped altogether without proving that any of these are consistently elevated.    Side effects include but are not limited to an increased heart rate, elevated blood pressure, dry mouth, insomnia, constipation and nervousness. Patient verbalized understanding and will stop this medication right away if they experience any of these symptoms.    ________________________________________________________________________________    Eat on a 7\"plate, level the food off (do not mound it up) and do not go back for seconds.     Make at least one-half of the plate non-starchy vegetables.     Limit starchy vegetables and grains to 1/4 - 1/2 of the plate     Limit the entree to no more than 1/4 of the plate    --------------------------------------------------------------------------------------------    Rules:  Use the Plate Method . Measure Serving sizes   Limit sweets to one day per month  Limit chips/crackers/pretzels/nuts/popcorn to 100-150 elmer/day  Eliminate all sugar sweetened beverages (including fruit juice)  Limit restaurants (including fast food and food from a convenience store) to one time every two weeks while in town    Requirements:  Make sure protein intake is at least 82 grams per day (do not count  2

## 2024-11-18 ENCOUNTER — OFFICE VISIT (OUTPATIENT)
Dept: SLEEP MEDICINE | Age: 29
End: 2024-11-18
Payer: COMMERCIAL

## 2024-11-18 VITALS
OXYGEN SATURATION: 100 % | HEIGHT: 62 IN | WEIGHT: 293 LBS | SYSTOLIC BLOOD PRESSURE: 136 MMHG | HEART RATE: 97 BPM | DIASTOLIC BLOOD PRESSURE: 94 MMHG | BODY MASS INDEX: 53.92 KG/M2 | RESPIRATION RATE: 14 BRPM

## 2024-11-18 DIAGNOSIS — G47.33 OSA (OBSTRUCTIVE SLEEP APNEA): Primary | ICD-10-CM

## 2024-11-18 PROCEDURE — 3075F SYST BP GE 130 - 139MM HG: CPT | Performed by: STUDENT IN AN ORGANIZED HEALTH CARE EDUCATION/TRAINING PROGRAM

## 2024-11-18 PROCEDURE — 3080F DIAST BP >= 90 MM HG: CPT | Performed by: STUDENT IN AN ORGANIZED HEALTH CARE EDUCATION/TRAINING PROGRAM

## 2024-11-18 PROCEDURE — 99204 OFFICE O/P NEW MOD 45 MIN: CPT | Performed by: STUDENT IN AN ORGANIZED HEALTH CARE EDUCATION/TRAINING PROGRAM

## 2024-11-18 ASSESSMENT — SLEEP AND FATIGUE QUESTIONNAIRES
HOW LIKELY ARE YOU TO NOD OFF OR FALL ASLEEP WHILE SITTING QUIETLY AFTER LUNCH WITHOUT ALCOHOL: SLIGHT CHANCE OF DOZING
HOW LIKELY ARE YOU TO NOD OFF OR FALL ASLEEP IN A CAR, WHILE STOPPED FOR A FEW MINUTES IN TRAFFIC: WOULD NEVER DOZE
ESS TOTAL SCORE: 9
HOW LIKELY ARE YOU TO NOD OFF OR FALL ASLEEP WHILE LYING DOWN TO REST IN THE AFTERNOON WHEN CIRCUMSTANCES PERMIT: SLIGHT CHANCE OF DOZING
HOW LIKELY ARE YOU TO NOD OFF OR FALL ASLEEP WHILE WATCHING TV: SLIGHT CHANCE OF DOZING
HOW LIKELY ARE YOU TO NOD OFF OR FALL ASLEEP WHILE SITTING INACTIVE IN A PUBLIC PLACE: MODERATE CHANCE OF DOZING
HOW LIKELY ARE YOU TO NOD OFF OR FALL ASLEEP WHEN YOU ARE A PASSENGER IN A CAR FOR AN HOUR WITHOUT A BREAK: HIGH CHANCE OF DOZING
HOW LIKELY ARE YOU TO NOD OFF OR FALL ASLEEP WHILE SITTING AND READING: SLIGHT CHANCE OF DOZING
HOW LIKELY ARE YOU TO NOD OFF OR FALL ASLEEP WHILE SITTING AND TALKING TO SOMEONE: WOULD NEVER DOZE

## 2024-11-18 NOTE — PROGRESS NOTES
software. All grammatical or semantic errors should be considered accordingly.    Level of Service:  Notes Reviewed: 3+ (PCP)  Labs Reviewed: 3+ (CBC, CMP, TSH)      Ralf Olivas M.D.  [facility]  Diplomate, American Board of Internal Medicine - Sleep Medicine  Diplomate, American Board of Internal Medicine

## 2024-11-21 ASSESSMENT — ENCOUNTER SYMPTOMS
NAUSEA: 0
CONSTIPATION: 0
VOMITING: 0
SHORTNESS OF BREATH: 0

## 2024-11-21 NOTE — PROGRESS NOTES
tablet 2    Norethin Ace-Eth Estrad-FE (BLISOVI 24 FE) 1-20 MG-MCG(24) TABS Take 1 tablet by mouth daily 84 tablet 3     No current facility-administered medications for this visit.      Review of Systems   Constitutional:  Negative for fatigue.   Respiratory:  Negative for shortness of breath.    Cardiovascular:  Negative for chest pain and palpitations.   Gastrointestinal:  Negative for constipation, nausea and vomiting.   Psychiatric/Behavioral:  Negative for sleep disturbance. The patient is not nervous/anxious.      /84 (Site: Left Upper Arm, Position: Sitting, Cuff Size: Thigh)   Pulse 82   Temp 97 °F (36.1 °C) (Temporal)   Ht 1.575 m (5' 2\")   Wt (!) 140.8 kg (310 lb 6.4 oz)   LMP 11/12/2024 (Approximate)   BMI 56.77 kg/m²     Physical Exam  Vitals reviewed.   Constitutional:       Appearance: She is obese.   Cardiovascular:      Rate and Rhythm: Normal rate and regular rhythm.      Heart sounds: Normal heart sounds. No murmur heard.  Pulmonary:      Effort: Pulmonary effort is normal. No respiratory distress.      Breath sounds: Normal breath sounds.   Musculoskeletal:      Right lower leg: No edema.      Left lower leg: No edema.   Neurological:      Mental Status: She is alert and oriented to person, place, and time.   Psychiatric:         Mood and Affect: Mood normal.         Behavior: Behavior normal.     ______________________    HISTORY & ASSESSMENT/PLAN -     Problem 1- Hypertension   HPI -Diagnosed after she was started on BC  for PCOS   126/76 BP today  Home Monitoring: not currently monitoring  Regimen amlodipine 2.5 mg daily  *controlled on current medication, patient asymptomatic.  Assessment  - Controlled Weight reduction can help.excess wt is contributing to the severity of her HTN and the intensity of anti-hypertensive therapy needed to control it  Plan   - Continue medication.  Weight reduction per plan below    Problem 2 -  Obesity  HPI - See above Background for

## 2024-11-22 ENCOUNTER — OFFICE VISIT (OUTPATIENT)
Dept: BARIATRICS/WEIGHT MGMT | Age: 29
End: 2024-11-22
Payer: COMMERCIAL

## 2024-11-22 VITALS
DIASTOLIC BLOOD PRESSURE: 84 MMHG | HEART RATE: 82 BPM | WEIGHT: 293 LBS | HEIGHT: 62 IN | SYSTOLIC BLOOD PRESSURE: 128 MMHG | BODY MASS INDEX: 53.92 KG/M2 | TEMPERATURE: 97 F

## 2024-11-22 DIAGNOSIS — I10 PRIMARY HYPERTENSION: Primary | ICD-10-CM

## 2024-11-22 DIAGNOSIS — E66.813 CLASS 3 SEVERE OBESITY DUE TO EXCESS CALORIES WITH SERIOUS COMORBIDITY AND BODY MASS INDEX (BMI) OF 50.0 TO 59.9 IN ADULT: ICD-10-CM

## 2024-11-22 DIAGNOSIS — E66.01 CLASS 3 SEVERE OBESITY DUE TO EXCESS CALORIES WITH SERIOUS COMORBIDITY AND BODY MASS INDEX (BMI) OF 50.0 TO 59.9 IN ADULT: ICD-10-CM

## 2024-11-22 PROCEDURE — 99214 OFFICE O/P EST MOD 30 MIN: CPT | Performed by: CLINICAL NURSE SPECIALIST

## 2024-11-22 PROCEDURE — 3074F SYST BP LT 130 MM HG: CPT | Performed by: CLINICAL NURSE SPECIALIST

## 2024-11-22 PROCEDURE — 3079F DIAST BP 80-89 MM HG: CPT | Performed by: CLINICAL NURSE SPECIALIST

## 2024-11-22 RX ORDER — PHENTERMINE HYDROCHLORIDE 37.5 MG/1
TABLET ORAL
Qty: 30 TABLET | Refills: 0 | Status: SHIPPED | OUTPATIENT
Start: 2024-11-22 | End: 2024-12-22

## 2024-11-22 NOTE — PATIENT INSTRUCTIONS
DESPITE STEROID DROPS. Patient Instructions:    Take phentermine 37.5 mg, one-half to one tablet daily as needed for appetite suppression. Take each dose 30-90 min before effect will be needed.    While taking phentermine, check the Blood Pressure every morning and every evening.     If the systolic BP is >155 mmHg, the diastolic BP is >90 mm/Hg or the heart rate is > 100 beats per minute, do not take phentermine that day.    If the systolic BP is consistently >155 mmHg, the diastolic BP is consistently above 90 mm/Hg or the heart rate is consistently > 100 beats per minute, then stop taking phentermine altogether.    If the systolic BP >170 mmHg or the diastolic BP is >100 mmHg (even if it is only once), then phentermine should be stopped altogether without proving that any of these are consistently elevated.    Side effects include but are not limited to an increased heart rate, elevated blood pressure, dry mouth, insomnia, constipation and nervousness. Patient verbalized understanding and will stop this medication right away if they experience any of these symptoms.    ________________________________________________________________________________    Eat on a 7\"plate, level the food off (do not mound it up) and do not go back for seconds.     Make at least one-half of the plate non-starchy vegetables.     Limit starchy vegetables and grains to 1/4 - 1/2 of the plate     Limit the entree to no more than 1/4 of the plate    --------------------------------------------------------------------------------------------    Rules:  Use the Plate Method . Measure Serving sizes   Limit sweets to one day per month  Limit chips/crackers/pretzels/nuts/popcorn to 100-150 elmer/day  Eliminate all sugar sweetened beverages (including fruit juice)  Limit restaurants (including fast food and food from a convenience store) to one time every two weeks while in town    Requirements:  Make sure protein intake is at least 82 grams per day (do not count

## 2024-12-13 ENCOUNTER — HOSPITAL ENCOUNTER (OUTPATIENT)
Dept: SLEEP CENTER | Age: 29
Discharge: HOME | End: 2024-12-13
Payer: COMMERCIAL

## 2024-12-13 DIAGNOSIS — G47.33 OSA (OBSTRUCTIVE SLEEP APNEA): ICD-10-CM

## 2024-12-13 PROCEDURE — 95800 SLP STDY UNATTENDED: CPT

## 2024-12-23 ENCOUNTER — OFFICE VISIT (OUTPATIENT)
Dept: BARIATRICS/WEIGHT MGMT | Age: 29
End: 2024-12-23
Payer: COMMERCIAL

## 2024-12-23 VITALS
DIASTOLIC BLOOD PRESSURE: 77 MMHG | HEART RATE: 91 BPM | TEMPERATURE: 97.6 F | BODY MASS INDEX: 53.92 KG/M2 | SYSTOLIC BLOOD PRESSURE: 138 MMHG | WEIGHT: 293 LBS | HEIGHT: 62 IN

## 2024-12-23 DIAGNOSIS — E66.813 CLASS 3 SEVERE OBESITY DUE TO EXCESS CALORIES WITH SERIOUS COMORBIDITY AND BODY MASS INDEX (BMI) OF 50.0 TO 59.9 IN ADULT: ICD-10-CM

## 2024-12-23 DIAGNOSIS — I10 PRIMARY HYPERTENSION: Primary | ICD-10-CM

## 2024-12-23 DIAGNOSIS — F33.0 MILD EPISODE OF RECURRENT MAJOR DEPRESSIVE DISORDER (HCC): ICD-10-CM

## 2024-12-23 DIAGNOSIS — E66.01 CLASS 3 SEVERE OBESITY DUE TO EXCESS CALORIES WITH SERIOUS COMORBIDITY AND BODY MASS INDEX (BMI) OF 50.0 TO 59.9 IN ADULT: ICD-10-CM

## 2024-12-23 DIAGNOSIS — I10 PRIMARY HYPERTENSION: ICD-10-CM

## 2024-12-23 PROCEDURE — 99211 OFF/OP EST MAY X REQ PHY/QHP: CPT

## 2024-12-23 PROCEDURE — 3078F DIAST BP <80 MM HG: CPT | Performed by: CLINICAL NURSE SPECIALIST

## 2024-12-23 PROCEDURE — 3075F SYST BP GE 130 - 139MM HG: CPT | Performed by: CLINICAL NURSE SPECIALIST

## 2024-12-23 PROCEDURE — 99214 OFFICE O/P EST MOD 30 MIN: CPT | Performed by: CLINICAL NURSE SPECIALIST

## 2024-12-23 RX ORDER — AMLODIPINE BESYLATE 2.5 MG/1
2.5 TABLET ORAL DAILY
Qty: 90 TABLET | Refills: 0 | Status: SHIPPED | OUTPATIENT
Start: 2024-12-23

## 2024-12-23 RX ORDER — PHENTERMINE HYDROCHLORIDE 37.5 MG/1
TABLET ORAL
Qty: 30 TABLET | Refills: 0 | Status: SHIPPED | OUTPATIENT
Start: 2024-12-23 | End: 2025-01-23

## 2024-12-23 RX ORDER — ESCITALOPRAM OXALATE 5 MG/1
5 TABLET ORAL DAILY
Qty: 90 TABLET | Refills: 0 | Status: SHIPPED | OUTPATIENT
Start: 2024-12-23

## 2024-12-23 ASSESSMENT — ENCOUNTER SYMPTOMS
SHORTNESS OF BREATH: 0
NAUSEA: 0
VOMITING: 0
CONSTIPATION: 0

## 2024-12-23 NOTE — TELEPHONE ENCOUNTER
Name of Medication(s) Requested:  Requested Prescriptions     Pending Prescriptions Disp Refills    amLODIPine (NORVASC) 2.5 MG tablet [Pharmacy Med Name: AMLODIPINE BESYLATE 2.5 MG TAB] 30 tablet 2     Sig: TAKE 1 TABLET BY MOUTH EVERY DAY    escitalopram (LEXAPRO) 5 MG tablet [Pharmacy Med Name: ESCITALOPRAM 5 MG TABLET] 30 tablet 2     Sig: TAKE 1 TABLET BY MOUTH EVERY DAY       Medication is on current medication list Yes    Dosage and directions were verified? Yes    Quantity verified: 30 day supply     Pharmacy Verified?  Yes    Last Appointment:  7/15/2024    Future appts:  Future Appointments   Date Time Provider Department Center   1/23/2025  7:30 AM Bobbi Mims, APRN - CNS [facility]   2/24/2025  7:45 AM Bobbi Mims, APRN - CNS [facility]   3/24/2025  7:30 AM John Mimsine, APRN - [facility]        (If no appt send self scheduling link. .REFILLAPPT)  Scheduling request sent?     [] Yes  [x] No    Does patient need updated?  [] Yes  [x] No

## 2024-12-23 NOTE — PROGRESS NOTES
CC -   HTN, Obesity    BACKGROUND -   Last visit: 11/22/2024  First visit: 09/23/2024    Obesity   Began  at age 6- 8 years old  Initial BMI 59.11, Wt 323.3 lbs Ht 5 ' 2\"  HS Grad wt 280 lbs   Lowest   wt 290 lbs   Highest  wt 323.3 lbs  Pattern of wt gain: gradual   Wt change past yr: gained 10 lbs   Most wt lost: 5 lbs   Other diets attempted: Asian Diet, high protein    Desire to lose weight: 9/10  Problem posed by appetite: 10/10 Brain hunger Boredom eating     Initial Diet:    Number of meals per day - 2    Number of snacks per day - 1    Meal volume - 10\" plate, sometimes  seconds    Fast food/convenience store - 3 x/week    Restaurants (not fast food) - 1 x/week   Sweets - 0 d/week   Chips - 5 d/week salt & vinegar bowlful   Crackers/pretzels - 1 d/week pretzel handful   Nuts - 0 d/week   Peanut Butter - 0 d/week   Popcorn - 1 d/week movie theater 1 bag   Dried fruit - 0 d/week   Whole fruit - 1 d/week   Breakfast cereal - 0 d/week   Granola/Protein/Energy bar - 0 d/week   Sugar sweetened beverages - large fountain coke 3 x week , lemonade simply strawberry 12 oz 1 x week,  pineapple eric 8 oz/night    Protein - No supplements   Fiber - No supplements   Multivitamin -none     Exercise:    Gym membership - no    Walking - no    Running - no    Resistance - no    Aerobic class - no   ___________________________    [facility] -  Past Medical History:   Diagnosis Date    Anxiety     Depression     Hypertension     Obesity     CHARLOTTE (obstructive sleep apnea)     PCOS (polycystic ovarian syndrome)      Current Outpatient Medications   Medication Sig Dispense Refill    phentermine (ADIPEX-P) 37.5 MG tablet Take phentermine 37.5 mg, one-half to one tablet daily  Take each dose 30-90 min before effect will be needed. 30 tablet 0    PHENTERMINE HCL PO Take by mouth      escitalopram (LEXAPRO) 5 MG tablet TAKE 1 TABLET BY MOUTH EVERY DAY 30 tablet 2    amLODIPine (NORVASC) 2.5 MG tablet TAKE 1 TABLET BY MOUTH EVERY DAY 30

## 2024-12-23 NOTE — PATIENT INSTRUCTIONS
Patient Instructions:    Goal weight for Jan' 25- 299. 44 lbs or less     Take phentermine 37.5 mg, one-half to one tablet daily as needed for appetite suppression. Take each dose 30-90 min before effect will be needed.    While taking phentermine, check the Blood Pressure every morning and every evening.     If the systolic BP is >155 mmHg, the diastolic BP is >90 mm/Hg or the heart rate is > 100 beats per minute, do not take phentermine that day.    If the systolic BP is consistently >155 mmHg, the diastolic BP is consistently above 90 mm/Hg or the heart rate is consistently > 100 beats per minute, then stop taking phentermine altogether.    If the systolic BP >170 mmHg or the diastolic BP is >100 mmHg (even if it is only once), then phentermine should be stopped altogether without proving that any of these are consistently elevated.    Side effects include but are not limited to an increased heart rate, elevated blood pressure, dry mouth, insomnia, constipation and nervousness. Patient verbalized understanding and will stop this medication right away if they experience any of these symptoms.    ________________________________________________________________________________    Eat on a 7\"plate, level the food off (do not mound it up) and do not go back for seconds.     Make at least one-half of the plate non-starchy vegetables.     Limit starchy vegetables and grains to 1/4 - 1/2 of the plate     Limit the entree to no more than 1/4 of the plate    --------------------------------------------------------------------------------------------    Rules:  Use the Plate Method . Measure Serving sizes   Limit sweets to one day per month  Limit chips/crackers/pretzels/nuts/popcorn to 100-150 elmer/day  Eliminate all sugar sweetened beverages (including fruit juice)  Limit restaurants (including fast food and food from a convenience store) to one time every two weeks while in town    Requirements:  Make sure protein intake

## 2025-01-17 DIAGNOSIS — G47.33 OSA (OBSTRUCTIVE SLEEP APNEA): Primary | ICD-10-CM

## 2025-01-20 ENCOUNTER — TELEPHONE (OUTPATIENT)
Dept: SLEEP CENTER | Age: 30
End: 2025-01-20

## 2025-01-20 NOTE — TELEPHONE ENCOUNTER
Call to pt discussed SS results and tx recommendations for pap therapy, also discussed compliance, mask exchange and f/u appt. Pt does not want to start cpap [place] and will not take the device with her. She will think about it and call the office back if she changes her mind

## 2025-01-23 ENCOUNTER — OFFICE VISIT (OUTPATIENT)
Dept: BARIATRICS/WEIGHT MGMT | Age: 30
End: 2025-01-23
Payer: COMMERCIAL

## 2025-01-23 VITALS
HEIGHT: 62 IN | TEMPERATURE: 97.2 F | BODY MASS INDEX: 53.92 KG/M2 | DIASTOLIC BLOOD PRESSURE: 89 MMHG | WEIGHT: 293 LBS | SYSTOLIC BLOOD PRESSURE: 150 MMHG | HEART RATE: 87 BPM

## 2025-01-23 DIAGNOSIS — E66.813 CLASS 3 SEVERE OBESITY DUE TO EXCESS CALORIES WITH SERIOUS COMORBIDITY AND BODY MASS INDEX (BMI) OF 50.0 TO 59.9 IN ADULT: ICD-10-CM

## 2025-01-23 DIAGNOSIS — I10 PRIMARY HYPERTENSION: Primary | ICD-10-CM

## 2025-01-23 DIAGNOSIS — E66.01 CLASS 3 SEVERE OBESITY DUE TO EXCESS CALORIES WITH SERIOUS COMORBIDITY AND BODY MASS INDEX (BMI) OF 50.0 TO 59.9 IN ADULT: ICD-10-CM

## 2025-01-23 PROCEDURE — 99211 OFF/OP EST MAY X REQ PHY/QHP: CPT

## 2025-01-23 PROCEDURE — 99214 OFFICE O/P EST MOD 30 MIN: CPT | Performed by: CLINICAL NURSE SPECIALIST

## 2025-01-23 PROCEDURE — 3077F SYST BP >= 140 MM HG: CPT | Performed by: CLINICAL NURSE SPECIALIST

## 2025-01-23 PROCEDURE — 3078F DIAST BP <80 MM HG: CPT | Performed by: CLINICAL NURSE SPECIALIST

## 2025-01-23 RX ORDER — PHENTERMINE HYDROCHLORIDE 37.5 MG/1
TABLET ORAL
Qty: 30 TABLET | Refills: 0 | Status: SHIPPED | OUTPATIENT
Start: 2025-01-23 | End: 2025-02-23

## 2025-01-23 ASSESSMENT — ENCOUNTER SYMPTOMS
NAUSEA: 0
SHORTNESS OF BREATH: 0
VOMITING: 0
CONSTIPATION: 0

## 2025-01-23 NOTE — PROGRESS NOTES
CC -   HTN, Obesity    BACKGROUND -   Last visit: 11/22/2024  First visit: 09/23/2024    Obesity   Began  at age 8- 8 years old  Initial BMI 59.11, Wt 323.3 lbs Ht 5 ' 2\"  HS Grad wt 280 lbs   Lowest   wt 290 lbs   Highest  wt 323.3 lbs  Pattern of wt gain: gradual   Wt change past yr: gained 10 lbs   Most wt lost: 5 lbs   Other diets attempted: Asian Diet, high protein    Desire to lose weight: 9/10  Problem posed by appetite: 10/10 Brain hunger Boredom eating     Initial Diet:    Number of meals per day - 2    Number of snacks per day - 1    Meal volume - 10\" plate, sometimes  seconds    Fast food/convenience store - 3 x/week    Restaurants (not fast food) - 1 x/week   Sweets - 0 d/week   Chips - 5 d/week salt & vinegar bowlful   Crackers/pretzels - 1 d/week pretzel handful   Nuts - 0 d/week   Peanut Butter - 0 d/week   Popcorn - 1 d/week movie theater 1 bag   Dried fruit - 0 d/week   Whole fruit - 1 d/week   Breakfast cereal - 0 d/week   Granola/Protein/Energy bar - 0 d/week   Sugar sweetened beverages - large fountain coke 3 x week , lemonade simply strawberry 12 oz 1 x week,  pineapple eric 8 oz/night    Protein - No supplements   Fiber - No supplements   Multivitamin -none     Exercise:    Gym membership - no    Walking - no    Running - no    Resistance - no    Aerobic class - no   ___________________________    [facility] -  Past Medical History:   Diagnosis Date    Anxiety     Depression     Hypertension     Obesity     CHARLOTTE (obstructive sleep apnea)     PCOS (polycystic ovarian syndrome)      Current Outpatient Medications   Medication Sig Dispense Refill    amLODIPine (NORVASC) 2.5 MG tablet TAKE 1 TABLET BY MOUTH EVERY DAY 90 tablet 0    escitalopram (LEXAPRO) 5 MG tablet TAKE 1 TABLET BY MOUTH EVERY DAY 90 tablet 0    phentermine (ADIPEX-P) 37.5 MG tablet Take phentermine 37.5 mg, one-half to one tablet daily  Take each dose 30-90 min before effect will be needed. 30 tablet 0    Norethin Ace-Eth Estrad-FE

## 2025-01-23 NOTE — PATIENT INSTRUCTIONS
Patient Instructions:      ________________________________________________________________________________    Eat on a 7\"plate, level the food off (do not mound it up) and do not go back for seconds.     Make at least one-half of the plate non-starchy vegetables.     Limit starchy vegetables and grains to 1/4 - 1/2 of the plate     Limit the entree to no more than 1/4 of the plate    --------------------------------------------------------------------------------------------    Rules:  Use the Plate Method . Measure Serving sizes   Limit sweets to one day per month  Limit chips/crackers/pretzels/nuts/popcorn to 100-150 elmer/day  Eliminate all sugar sweetened beverages (including fruit juice)  Limit restaurants (including fast food and food from a convenience store) to one time every two weeks while in town    Requirements:  Make sure protein intake is at least 82 grams per day (do not count protein every day; instead spot check your intake every 2-3 weeks and make sure what you think you are getting is close to accurate.  Make sure fiber intake is at least 22 grams/day.   Drink at least 64 oz of water each day  Take one multivitamin every day    Targets:  Limit calorie intake to 1400 calories/day  Walk 15 minutes  3 x week   Avoid eating 2 hours within bedtime.     Tips:  Do not eat outside of the dining room or the kitchen  Do not eat while watching TV, videos, working on the computer or using a smart phone  Do not eat food out of a multi-serving bag or container.  Establish 6 hours of food-free \"time-out\" periods (times you don't eat) each day. No period can be less than 1 hour long. The periods need to be the same every day for days that are the same (for example, workdays would have one set of food free periods and weekends would have another set of days). These six hours are in addition to the two hours before bedtime and the time spent sleeping.

## 2025-02-24 ENCOUNTER — OFFICE VISIT (OUTPATIENT)
Dept: BARIATRICS/WEIGHT MGMT | Age: 30
End: 2025-02-24
Payer: COMMERCIAL

## 2025-02-24 VITALS
TEMPERATURE: 97.3 F | WEIGHT: 293 LBS | HEIGHT: 62 IN | BODY MASS INDEX: 53.92 KG/M2 | DIASTOLIC BLOOD PRESSURE: 70 MMHG | SYSTOLIC BLOOD PRESSURE: 150 MMHG | HEART RATE: 86 BPM

## 2025-02-24 DIAGNOSIS — G47.33 OSA (OBSTRUCTIVE SLEEP APNEA): ICD-10-CM

## 2025-02-24 DIAGNOSIS — I10 PRIMARY HYPERTENSION: Primary | ICD-10-CM

## 2025-02-24 DIAGNOSIS — E66.01 CLASS 3 SEVERE OBESITY DUE TO EXCESS CALORIES WITH SERIOUS COMORBIDITY AND BODY MASS INDEX (BMI) OF 50.0 TO 59.9 IN ADULT: ICD-10-CM

## 2025-02-24 DIAGNOSIS — E66.813 CLASS 3 SEVERE OBESITY DUE TO EXCESS CALORIES WITH SERIOUS COMORBIDITY AND BODY MASS INDEX (BMI) OF 50.0 TO 59.9 IN ADULT: ICD-10-CM

## 2025-02-24 PROCEDURE — 3078F DIAST BP <80 MM HG: CPT | Performed by: CLINICAL NURSE SPECIALIST

## 2025-02-24 PROCEDURE — 3077F SYST BP >= 140 MM HG: CPT | Performed by: CLINICAL NURSE SPECIALIST

## 2025-02-24 PROCEDURE — 99211 OFF/OP EST MAY X REQ PHY/QHP: CPT

## 2025-02-24 PROCEDURE — 99214 OFFICE O/P EST MOD 30 MIN: CPT | Performed by: CLINICAL NURSE SPECIALIST

## 2025-02-24 RX ORDER — BUPROPION HYDROCHLORIDE 150 MG/1
150 TABLET ORAL EVERY MORNING
Qty: 90 TABLET | Refills: 0 | Status: SHIPPED | OUTPATIENT
Start: 2025-02-24

## 2025-02-24 ASSESSMENT — ENCOUNTER SYMPTOMS
VOMITING: 0
CONSTIPATION: 0
SHORTNESS OF BREATH: 0
NAUSEA: 0

## 2025-02-24 NOTE — PROGRESS NOTES
CC -   HTN, Obesity    BACKGROUND -   Last visit: 1/23/2025  First visit: 09/23/2024    Obesity   Began  at age 8- 8 years old  Initial BMI 59.11, Wt 323.3 lbs Ht 5 ' 2\"  HS Grad wt 280 lbs   Lowest   wt 290 lbs   Highest  wt 323.3 lbs  Pattern of wt gain: gradual   Wt change past yr: gained 10 lbs   Most wt lost: 5 lbs   Other diets attempted: Asian Diet, high protein    Desire to lose weight: 9/10  Problem posed by appetite: 10/10 Brain hunger Boredom eating     Initial Diet:    Number of meals per day - 2    Number of snacks per day - 1    Meal volume - 10\" plate, sometimes  seconds    Fast food/convenience store - 3 x/week    Restaurants (not fast food) - 1 x/week   Sweets - 0 d/week   Chips - 5 d/week salt & vinegar bowlful   Crackers/pretzels - 1 d/week pretzel handful   Nuts - 0 d/week   Peanut Butter - 0 d/week   Popcorn - 1 d/week movie theater 1 bag   Dried fruit - 0 d/week   Whole fruit - 1 d/week   Breakfast cereal - 0 d/week   Granola/Protein/Energy bar - 0 d/week   Sugar sweetened beverages - large fountain coke 3 x week , lemonade simply strawberry 12 oz 1 x week,  pineapple eric 8 oz/night    Protein - No supplements   Fiber - No supplements   Multivitamin -none     Exercise:    Gym membership - no    Walking - no    Running - no    Resistance - no    Aerobic class - no   ___________________________    [facility] -  Past Medical History:   Diagnosis Date    Anxiety     Depression     Hypertension     Obesity     CHARLOTTE (obstructive sleep apnea)     PCOS (polycystic ovarian syndrome)      Current Outpatient Medications   Medication Sig Dispense Refill    buPROPion (WELLBUTRIN XL) 150 MG extended release tablet Take 1 tablet by mouth every morning 90 tablet 0    amLODIPine (NORVASC) 2.5 MG tablet TAKE 1 TABLET BY MOUTH EVERY DAY 90 tablet 0    escitalopram (LEXAPRO) 5 MG tablet TAKE 1 TABLET BY MOUTH EVERY DAY 90 tablet 0    Norethin Ace-Eth Estrad-FE (BLISOVI 24 FE) 1-20 MG-MCG(24) TABS Take 1 tablet by

## 2025-02-24 NOTE — PATIENT INSTRUCTIONS
Patient Instructions:    Take Bupropion XL (Wellbutrin XL) 150 mg, one tablet every morning; if appetite suppression is insufficient after two weeks, then stop it    While taking it, check the BP twice each day for one week. If the systolic BP is >155 mmHg or the diastolic BP is >90 mmHg consistently, then stop taking it.    ________________________________________________________________________________    Eat on a 7\"plate, level the food off (do not mound it up) and do not go back for seconds.     Make at least one-half of the plate non-starchy vegetables.     Limit starchy vegetables and grains to 1/4 - 1/2 of the plate     Limit the entree to no more than 1/4 of the plate    --------------------------------------------------------------------------------------------    Rules:  Use the Plate Method . Measure Serving sizes   Limit sweets to one day per month  Limit chips/crackers/pretzels/nuts/popcorn to 100-150 elmer/day  Eliminate all sugar sweetened beverages (including fruit juice)  Limit restaurants (including fast food and food from a convenience store) to one time every two weeks while in town    Requirements:  Make sure protein intake is at least 82 grams per day (do not count protein every day; instead spot check your intake every 2-3 weeks and make sure what you think you are getting is close to accurate.  Make sure fiber intake is at least 22 grams/day.   Drink at least 64 oz of water each day  Take one multivitamin every day    Targets:  Limit calorie intake to 1400 calories/day  Walk 15 minutes  3 x week   Avoid eating 2 hours within bedtime.     Tips:  Do not eat outside of the dining room or the kitchen  Do not eat while watching TV, videos, working on the computer or using a smart phone  Do not eat food out of a multi-serving bag or container.  Establish 6 hours of food-free \"time-out\" periods (times you don't eat) each day. No period can be less than 1 hour long. The periods need to be the same

## 2025-03-02 DIAGNOSIS — N92.1 MENORRHAGIA WITH IRREGULAR CYCLE: ICD-10-CM

## 2025-03-03 RX ORDER — NORETHINDRONE ACETATE AND ETHINYL ESTRADIOL 1MG-20(24)
1 KIT ORAL DAILY
Qty: 28 TABLET | Refills: 2 | Status: SHIPPED | OUTPATIENT
Start: 2025-03-03

## 2025-03-03 NOTE — TELEPHONE ENCOUNTER
Name of Medication(s) Requested:  Requested Prescriptions     Pending Prescriptions Disp Refills    BLISOVI 24 FE 1-20 MG-MCG(24) TABS [Pharmacy Med Name: BLISOVI 24 FE TABLET] 28 tablet 11     Sig: TAKE 1 TABLET BY MOUTH EVERY DAY       Medication is on current medication list Yes    Dosage and directions were verified? Yes    Quantity verified: 30 day supply     Pharmacy Verified?  Yes    Last Appointment:  2/20/2024    Future appts:  Future Appointments   Date Time Provider Department Center   5/15/2025  8:30 AM Bobbi Mims APRN - CNS [facility]        (If no appt send self scheduling link. .REFILLAPPT)  Scheduling request sent?     [] Yes  [x] No    Does patient need updated?  [] Yes  [x] No

## 2025-04-05 DIAGNOSIS — F33.0 MILD EPISODE OF RECURRENT MAJOR DEPRESSIVE DISORDER: ICD-10-CM

## 2025-04-05 DIAGNOSIS — I10 PRIMARY HYPERTENSION: ICD-10-CM

## 2025-04-07 RX ORDER — AMLODIPINE BESYLATE 2.5 MG/1
2.5 TABLET ORAL DAILY
Qty: 30 TABLET | Refills: 2 | Status: SHIPPED | OUTPATIENT
Start: 2025-04-07

## 2025-04-07 RX ORDER — ESCITALOPRAM OXALATE 5 MG/1
5 TABLET ORAL DAILY
Qty: 30 TABLET | Refills: 2 | Status: SHIPPED | OUTPATIENT
Start: 2025-04-07

## 2025-04-07 NOTE — TELEPHONE ENCOUNTER
Name of Medication(s) Requested:  Requested Prescriptions     Pending Prescriptions Disp Refills    amLODIPine (NORVASC) 2.5 MG tablet [Pharmacy Med Name: AMLODIPINE BESYLATE 2.5 MG TAB] 30 tablet 2     Sig: TAKE 1 TABLET BY MOUTH EVERY DAY    escitalopram (LEXAPRO) 5 MG tablet [Pharmacy Med Name: ESCITALOPRAM 5 MG TABLET] 30 tablet 2     Sig: TAKE 1 TABLET BY MOUTH EVERY DAY       Medication is on current medication list Yes    Dosage and directions were verified? Yes    Quantity verified: 30 day supply     Pharmacy Verified?  Yes    Last Appointment:  7/15/2024    Future appts:  Future Appointments   Date Time Provider Department Center   5/15/2025  8:30 AM Bobbi Mims APRN - CNS [facility]        (If no appt send self scheduling link. .REFILLAPPT)  Scheduling request sent?     [] Yes  [] No    Does patient need updated?  [] Yes  [] No

## 2025-09-01 SDOH — ECONOMIC STABILITY: FOOD INSECURITY: WITHIN THE PAST 12 MONTHS, YOU WORRIED THAT YOUR FOOD WOULD RUN OUT BEFORE YOU GOT MONEY TO BUY MORE.: NEVER TRUE

## 2025-09-01 SDOH — ECONOMIC STABILITY: INCOME INSECURITY: IN THE LAST 12 MONTHS, WAS THERE A TIME WHEN YOU WERE NOT ABLE TO PAY THE MORTGAGE OR RENT ON TIME?: NO

## 2025-09-01 SDOH — ECONOMIC STABILITY: FOOD INSECURITY: WITHIN THE PAST 12 MONTHS, THE FOOD YOU BOUGHT JUST DIDN'T LAST AND YOU DIDN'T HAVE MONEY TO GET MORE.: NEVER TRUE

## 2025-09-01 ASSESSMENT — PATIENT HEALTH QUESTIONNAIRE - PHQ9
6. FEELING BAD ABOUT YOURSELF - OR THAT YOU ARE A FAILURE OR HAVE LET YOURSELF OR YOUR FAMILY DOWN: NEARLY EVERY DAY
6. FEELING BAD ABOUT YOURSELF - OR THAT YOU ARE A FAILURE OR HAVE LET YOURSELF OR YOUR FAMILY DOWN: NEARLY EVERY DAY
9. THOUGHTS THAT YOU WOULD BE BETTER OFF DEAD, OR OF HURTING YOURSELF: SEVERAL DAYS
4. FEELING TIRED OR HAVING LITTLE ENERGY: MORE THAN HALF THE DAYS
4. FEELING TIRED OR HAVING LITTLE ENERGY: MORE THAN HALF THE DAYS
2. FEELING DOWN, DEPRESSED OR HOPELESS: NEARLY EVERY DAY
SUM OF ALL RESPONSES TO PHQ QUESTIONS 1-9: 22
8. MOVING OR SPEAKING SO SLOWLY THAT OTHER PEOPLE COULD HAVE NOTICED. OR THE OPPOSITE - BEING SO FIDGETY OR RESTLESS THAT YOU HAVE BEEN MOVING AROUND A LOT MORE THAN USUAL: NEARLY EVERY DAY
1. LITTLE INTEREST OR PLEASURE IN DOING THINGS: MORE THAN HALF THE DAYS
10. IF YOU CHECKED OFF ANY PROBLEMS, HOW DIFFICULT HAVE THESE PROBLEMS MADE IT FOR YOU TO DO YOUR WORK, TAKE CARE OF THINGS AT HOME, OR GET ALONG WITH OTHER PEOPLE: SOMEWHAT DIFFICULT
5. POOR APPETITE OR OVEREATING: NEARLY EVERY DAY
3. TROUBLE FALLING OR STAYING ASLEEP: MORE THAN HALF THE DAYS
10. IF YOU CHECKED OFF ANY PROBLEMS, HOW DIFFICULT HAVE THESE PROBLEMS MADE IT FOR YOU TO DO YOUR WORK, TAKE CARE OF THINGS AT HOME, OR GET ALONG WITH OTHER PEOPLE: SOMEWHAT DIFFICULT
SUM OF ALL RESPONSES TO PHQ QUESTIONS 1-9: 21
SUM OF ALL RESPONSES TO PHQ QUESTIONS 1-9: 22
3. TROUBLE FALLING OR STAYING ASLEEP: MORE THAN HALF THE DAYS
2. FEELING DOWN, DEPRESSED OR HOPELESS: NEARLY EVERY DAY
7. TROUBLE CONCENTRATING ON THINGS, SUCH AS READING THE NEWSPAPER OR WATCHING TELEVISION: NEARLY EVERY DAY
SUM OF ALL RESPONSES TO PHQ QUESTIONS 1-9: 22
SUM OF ALL RESPONSES TO PHQ QUESTIONS 1-9: 22
7. TROUBLE CONCENTRATING ON THINGS, SUCH AS READING THE NEWSPAPER OR WATCHING TELEVISION: NEARLY EVERY DAY
1. LITTLE INTEREST OR PLEASURE IN DOING THINGS: MORE THAN HALF THE DAYS
5. POOR APPETITE OR OVEREATING: NEARLY EVERY DAY
8. MOVING OR SPEAKING SO SLOWLY THAT OTHER PEOPLE COULD HAVE NOTICED. OR THE OPPOSITE, BEING SO FIGETY OR RESTLESS THAT YOU HAVE BEEN MOVING AROUND A LOT MORE THAN USUAL: NEARLY EVERY DAY
9. THOUGHTS THAT YOU WOULD BE BETTER OFF DEAD, OR OF HURTING YOURSELF: SEVERAL DAYS

## 2025-09-01 ASSESSMENT — COLUMBIA-SUICIDE SEVERITY RATING SCALE - C-SSRS
7. DID THIS OCCUR IN THE LAST THREE MONTHS: NO
2. IN THE PAST MONTH, HAVE YOU ACTUALLY HAD ANY THOUGHTS OF KILLING YOURSELF?: NO
6. IN YOUR LIFETIME, HAVE YOU EVER DONE ANYTHING, STARTED TO DO ANYTHING, OR PREPARED TO DO ANYTHING TO END YOUR LIFE?: YES
1. IN THE PAST MONTH, HAVE YOU WISHED YOU WERE DEAD OR WISHED YOU COULD GO TO SLEEP AND NOT WAKE UP?: YES

## 2025-09-02 ENCOUNTER — OFFICE VISIT (OUTPATIENT)
Age: 30
End: 2025-09-02
Payer: COMMERCIAL

## 2025-09-02 VITALS
HEART RATE: 86 BPM | TEMPERATURE: 97.3 F | BODY MASS INDEX: 53.92 KG/M2 | HEIGHT: 62 IN | SYSTOLIC BLOOD PRESSURE: 139 MMHG | DIASTOLIC BLOOD PRESSURE: 80 MMHG | WEIGHT: 293 LBS

## 2025-09-02 DIAGNOSIS — G47.33 OSA (OBSTRUCTIVE SLEEP APNEA): Primary | ICD-10-CM

## 2025-09-02 DIAGNOSIS — I10 PRIMARY HYPERTENSION: ICD-10-CM

## 2025-09-02 PROCEDURE — 99214 OFFICE O/P EST MOD 30 MIN: CPT | Performed by: CLINICAL NURSE SPECIALIST

## 2025-09-02 PROCEDURE — 3075F SYST BP GE 130 - 139MM HG: CPT | Performed by: CLINICAL NURSE SPECIALIST

## 2025-09-02 PROCEDURE — 3079F DIAST BP 80-89 MM HG: CPT | Performed by: CLINICAL NURSE SPECIALIST

## 2025-09-02 RX ORDER — PHENTERMINE HYDROCHLORIDE 37.5 MG/1
TABLET ORAL
Qty: 30 TABLET | Refills: 0 | Status: SHIPPED | OUTPATIENT
Start: 2025-09-02 | End: 2025-10-02

## 2025-09-02 ASSESSMENT — ENCOUNTER SYMPTOMS
VOMITING: 0
CONSTIPATION: 0
SHORTNESS OF BREATH: 0
NAUSEA: 0

## 2025-09-03 ENCOUNTER — OFFICE VISIT (OUTPATIENT)
Dept: FAMILY MEDICINE CLINIC | Age: 30
End: 2025-09-03
Payer: COMMERCIAL

## 2025-09-03 VITALS
DIASTOLIC BLOOD PRESSURE: 68 MMHG | BODY MASS INDEX: 53.92 KG/M2 | HEIGHT: 62 IN | SYSTOLIC BLOOD PRESSURE: 122 MMHG | WEIGHT: 293 LBS | RESPIRATION RATE: 18 BRPM | TEMPERATURE: 98.2 F | HEART RATE: 99 BPM

## 2025-09-03 DIAGNOSIS — F33.0 MILD EPISODE OF RECURRENT MAJOR DEPRESSIVE DISORDER: ICD-10-CM

## 2025-09-03 DIAGNOSIS — E66.813 CLASS 3 SEVERE OBESITY DUE TO EXCESS CALORIES WITHOUT SERIOUS COMORBIDITY WITH BODY MASS INDEX (BMI) OF 50.0 TO 59.9 IN ADULT (HCC): Primary | ICD-10-CM

## 2025-09-03 PROCEDURE — 3078F DIAST BP <80 MM HG: CPT

## 2025-09-03 PROCEDURE — 3074F SYST BP LT 130 MM HG: CPT

## 2025-09-03 PROCEDURE — 99213 OFFICE O/P EST LOW 20 MIN: CPT

## 2025-09-03 RX ORDER — ESCITALOPRAM OXALATE 10 MG/1
10 TABLET ORAL DAILY
Qty: 30 TABLET | Refills: 2 | Status: SHIPPED | OUTPATIENT
Start: 2025-09-03

## 2025-09-05 DIAGNOSIS — I10 PRIMARY HYPERTENSION: ICD-10-CM

## 2025-09-05 RX ORDER — AMLODIPINE BESYLATE 2.5 MG/1
2.5 TABLET ORAL DAILY
Qty: 30 TABLET | Refills: 2 | Status: SHIPPED | OUTPATIENT
Start: 2025-09-05